# Patient Record
Sex: FEMALE | Race: BLACK OR AFRICAN AMERICAN | NOT HISPANIC OR LATINO | URBAN - METROPOLITAN AREA
[De-identification: names, ages, dates, MRNs, and addresses within clinical notes are randomized per-mention and may not be internally consistent; named-entity substitution may affect disease eponyms.]

---

## 2020-06-11 ENCOUNTER — OFFICE VISIT (OUTPATIENT)
Dept: URBAN - METROPOLITAN AREA CLINIC 91 | Facility: CLINIC | Age: 20
End: 2020-06-11
Payer: COMMERCIAL

## 2020-06-11 DIAGNOSIS — K51.80 CHRONIC PANCOLONIC ULCERATIVE COLITIS: ICD-10-CM

## 2020-06-11 PROCEDURE — 96413 CHEMO IV INFUSION 1 HR: CPT | Performed by: INTERNAL MEDICINE

## 2020-06-11 PROCEDURE — 96415 CHEMO IV INFUSION ADDL HR: CPT | Performed by: INTERNAL MEDICINE

## 2020-06-11 RX ORDER — MESALAMINE 1.2 G/1
TAKE 4 TABLETS (4.8 GRAM) BY ORAL ROUTE ONCE DAILY WITH A MEAL FOR 30 DAYS TABLET, DELAYED RELEASE ORAL 1
Qty: 120 | Refills: 5 | Status: ACTIVE | COMMUNITY
Start: 2017-10-05 | End: 1900-01-01

## 2020-06-11 RX ORDER — FLUCONAZOLE 150 MG/1
TAKE 1 TABLET (150 MG) BY ORAL ROUTE ONCE TABLET ORAL
Qty: 1 | Refills: 1 | Status: ACTIVE | COMMUNITY
Start: 2018-03-12 | End: 1900-01-01

## 2020-06-11 RX ORDER — DICYCLOMINE HYDROCHLORIDE 20 MG/1
TAKE 1 TABLET BY ORAL ROUTE 3 TIMES A DAY AS NEEDED FOR 30 DAYS TABLET ORAL
Qty: 90 | Refills: 5 | Status: ACTIVE | COMMUNITY
Start: 2020-02-03 | End: 2020-08-01

## 2020-06-11 RX ORDER — INFLIXIMAB 100 MG/10ML
INJECTION, POWDER, LYOPHILIZED, FOR SOLUTION INTRAVENOUS
Qty: 0 | Refills: 0 | Status: ACTIVE | COMMUNITY
Start: 1900-01-01 | End: 1900-01-01

## 2020-06-11 RX ORDER — MESALAMINE 375 MG/1
TAKE 4 CAPSULES (1,500 MG) BY ORAL ROUTE ONCE DAILY IN THE MORNING FOR 30 DAYS CAPSULE, EXTENDED RELEASE ORAL 1
Qty: 120 | Refills: 5 | Status: ACTIVE | COMMUNITY
Start: 2020-02-03 | End: 2020-08-01

## 2020-06-11 RX ORDER — BUDESONIDE 9 MG/1
1 TAB PO QD TABLET, EXTENDED RELEASE ORAL 1
Qty: 28 | Refills: 5 | Status: ACTIVE | COMMUNITY
Start: 2020-04-14 | End: 2020-09-29

## 2020-07-24 ENCOUNTER — OFFICE VISIT (OUTPATIENT)
Dept: URBAN - METROPOLITAN AREA CLINIC 91 | Facility: CLINIC | Age: 20
End: 2020-07-24

## 2020-07-31 ENCOUNTER — OFFICE VISIT (OUTPATIENT)
Dept: URBAN - METROPOLITAN AREA CLINIC 117 | Facility: CLINIC | Age: 20
End: 2020-07-31
Payer: COMMERCIAL

## 2020-07-31 DIAGNOSIS — K51.80 CHRONIC PANCOLONIC ULCERATIVE COLITIS: ICD-10-CM

## 2020-07-31 PROCEDURE — 96413 CHEMO IV INFUSION 1 HR: CPT | Performed by: INTERNAL MEDICINE

## 2020-07-31 PROCEDURE — 96375 TX/PRO/DX INJ NEW DRUG ADDON: CPT | Performed by: INTERNAL MEDICINE

## 2020-07-31 PROCEDURE — 96415 CHEMO IV INFUSION ADDL HR: CPT | Performed by: INTERNAL MEDICINE

## 2020-07-31 RX ORDER — BUDESONIDE 9 MG/1
1 TAB PO QD TABLET, EXTENDED RELEASE ORAL 1
Qty: 28 | Refills: 5 | Status: ACTIVE | COMMUNITY
Start: 2020-04-14 | End: 2020-09-29

## 2020-07-31 RX ORDER — MESALAMINE 1.2 G/1
TAKE 4 TABLETS (4.8 GRAM) BY ORAL ROUTE ONCE DAILY WITH A MEAL FOR 30 DAYS TABLET, DELAYED RELEASE ORAL 1
Qty: 120 | Refills: 5 | Status: ACTIVE | COMMUNITY
Start: 2017-10-05 | End: 1900-01-01

## 2020-07-31 RX ORDER — INFLIXIMAB 100 MG/10ML
INJECTION, POWDER, LYOPHILIZED, FOR SOLUTION INTRAVENOUS
Qty: 0 | Refills: 0 | Status: ACTIVE | COMMUNITY
Start: 1900-01-01 | End: 1900-01-01

## 2020-07-31 RX ORDER — MESALAMINE 375 MG/1
TAKE 4 CAPSULES (1,500 MG) BY ORAL ROUTE ONCE DAILY IN THE MORNING FOR 30 DAYS CAPSULE, EXTENDED RELEASE ORAL 1
Qty: 120 | Refills: 5 | Status: ACTIVE | COMMUNITY
Start: 2020-02-03 | End: 2020-08-01

## 2020-07-31 RX ORDER — FLUCONAZOLE 150 MG/1
TAKE 1 TABLET (150 MG) BY ORAL ROUTE ONCE TABLET ORAL
Qty: 1 | Refills: 1 | Status: ACTIVE | COMMUNITY
Start: 2018-03-12 | End: 1900-01-01

## 2020-07-31 RX ORDER — DICYCLOMINE HYDROCHLORIDE 20 MG/1
TAKE 1 TABLET BY ORAL ROUTE 3 TIMES A DAY AS NEEDED FOR 30 DAYS TABLET ORAL
Qty: 90 | Refills: 5 | Status: ACTIVE | COMMUNITY
Start: 2020-02-03 | End: 2020-08-01

## 2020-09-03 ENCOUNTER — OFFICE VISIT (OUTPATIENT)
Dept: URBAN - METROPOLITAN AREA CLINIC 117 | Facility: CLINIC | Age: 20
End: 2020-09-03
Payer: COMMERCIAL

## 2020-09-03 DIAGNOSIS — K51.80 CHRONIC PANCOLONIC ULCERATIVE COLITIS: ICD-10-CM

## 2020-09-03 PROCEDURE — 96375 TX/PRO/DX INJ NEW DRUG ADDON: CPT | Performed by: INTERNAL MEDICINE

## 2020-09-03 PROCEDURE — 96415 CHEMO IV INFUSION ADDL HR: CPT | Performed by: INTERNAL MEDICINE

## 2020-09-03 PROCEDURE — 96413 CHEMO IV INFUSION 1 HR: CPT | Performed by: INTERNAL MEDICINE

## 2020-09-03 RX ORDER — INFLIXIMAB 100 MG/10ML
INJECTION, POWDER, LYOPHILIZED, FOR SOLUTION INTRAVENOUS
Qty: 0 | Refills: 0 | Status: ACTIVE | COMMUNITY
Start: 1900-01-01 | End: 1900-01-01

## 2020-09-03 RX ORDER — BUDESONIDE 9 MG/1
1 TAB PO QD TABLET, EXTENDED RELEASE ORAL 1
Qty: 28 | Refills: 5 | Status: ACTIVE | COMMUNITY
Start: 2020-04-14 | End: 2020-09-29

## 2020-09-03 RX ORDER — MESALAMINE 1.2 G/1
TAKE 4 TABLETS (4.8 GRAM) BY ORAL ROUTE ONCE DAILY WITH A MEAL FOR 30 DAYS TABLET, DELAYED RELEASE ORAL 1
Qty: 120 | Refills: 5 | Status: ACTIVE | COMMUNITY
Start: 2017-10-05 | End: 1900-01-01

## 2020-09-03 RX ORDER — FLUCONAZOLE 150 MG/1
TAKE 1 TABLET (150 MG) BY ORAL ROUTE ONCE TABLET ORAL
Qty: 1 | Refills: 1 | Status: ACTIVE | COMMUNITY
Start: 2018-03-12 | End: 1900-01-01

## 2020-10-15 ENCOUNTER — OFFICE VISIT (OUTPATIENT)
Dept: URBAN - METROPOLITAN AREA CLINIC 117 | Facility: CLINIC | Age: 20
End: 2020-10-15
Payer: COMMERCIAL

## 2020-10-15 DIAGNOSIS — K51.80 CHRONIC PANCOLONIC ULCERATIVE COLITIS: ICD-10-CM

## 2020-10-15 PROCEDURE — 96375 TX/PRO/DX INJ NEW DRUG ADDON: CPT | Performed by: INTERNAL MEDICINE

## 2020-10-15 PROCEDURE — 96415 CHEMO IV INFUSION ADDL HR: CPT | Performed by: INTERNAL MEDICINE

## 2020-10-15 PROCEDURE — 96413 CHEMO IV INFUSION 1 HR: CPT | Performed by: INTERNAL MEDICINE

## 2020-10-15 RX ORDER — INFLIXIMAB 100 MG/10ML
INJECTION, POWDER, LYOPHILIZED, FOR SOLUTION INTRAVENOUS
Qty: 0 | Refills: 0 | Status: ACTIVE | COMMUNITY
Start: 1900-01-01 | End: 1900-01-01

## 2020-10-15 RX ORDER — FLUCONAZOLE 150 MG/1
TAKE 1 TABLET (150 MG) BY ORAL ROUTE ONCE TABLET ORAL
Qty: 1 | Refills: 1 | Status: ACTIVE | COMMUNITY
Start: 2018-03-12 | End: 1900-01-01

## 2020-10-15 RX ORDER — MESALAMINE 1.2 G/1
TAKE 4 TABLETS (4.8 GRAM) BY ORAL ROUTE ONCE DAILY WITH A MEAL FOR 30 DAYS TABLET, DELAYED RELEASE ORAL 1
Qty: 120 | Refills: 5 | Status: ACTIVE | COMMUNITY
Start: 2017-10-05 | End: 1900-01-01

## 2020-11-12 ENCOUNTER — OFFICE VISIT (OUTPATIENT)
Dept: URBAN - METROPOLITAN AREA CLINIC 118 | Facility: CLINIC | Age: 20
End: 2020-11-12
Payer: COMMERCIAL

## 2020-11-12 ENCOUNTER — LAB OUTSIDE AN ENCOUNTER (OUTPATIENT)
Dept: URBAN - METROPOLITAN AREA CLINIC 118 | Facility: CLINIC | Age: 20
End: 2020-11-12

## 2020-11-12 DIAGNOSIS — R19.4 CHANGE IN BOWEL HABITS: ICD-10-CM

## 2020-11-12 DIAGNOSIS — K52.9 IBD (INFLAMMATORY BOWEL DISEASE): ICD-10-CM

## 2020-11-12 PROCEDURE — 99214 OFFICE O/P EST MOD 30 MIN: CPT | Performed by: INTERNAL MEDICINE

## 2020-11-12 RX ORDER — FLUCONAZOLE 150 MG/1
TAKE 1 TABLET (150 MG) BY ORAL ROUTE ONCE TABLET ORAL
Qty: 1 | Refills: 1 | Status: ACTIVE | COMMUNITY
Start: 2018-03-12

## 2020-11-12 RX ORDER — INFLIXIMAB 100 MG/10ML
INJECTION, POWDER, LYOPHILIZED, FOR SOLUTION INTRAVENOUS
Qty: 0 | Refills: 0 | Status: ACTIVE | COMMUNITY
Start: 1900-01-01

## 2020-11-12 RX ORDER — MESALAMINE 1.2 G/1
TAKE 4 TABLETS (4.8 GRAM) BY ORAL ROUTE ONCE DAILY WITH A MEAL FOR 30 DAYS TABLET, DELAYED RELEASE ORAL 1
Qty: 120 | Refills: 5 | Status: ACTIVE | COMMUNITY
Start: 2017-10-05

## 2020-11-12 NOTE — HPI-TODAY'S VISIT:
pt h/o UC presents for follow up. Reports doing well, denies abdominal pain or diarrhea. Reports at baseline 2-3 bm per day. Denies weight loss or anemia. Denies UGI symptoms. Last colonoscopy 2019.

## 2020-11-13 LAB
A/G RATIO: 1.5
ALBUMIN: 4.4
ALKALINE PHOSPHATASE: 62
AST (SGOT): 18
BASO (ABSOLUTE): 0
BASOS: 1
BILIRUBIN, TOTAL: <0.2
BUN/CREATININE RATIO: 14
BUN: 8
C-REACTIVE PROTEIN, QUANT: <1
CALCIUM: 9.3
CARBON DIOXIDE, TOTAL: 21
CHLORIDE: 107
CREATININE: 0.58
EGFR IF AFRICN AM: 152
EGFR IF NONAFRICN AM: 132
EOS (ABSOLUTE): 0.1
EOS: 2
GLOBULIN, TOTAL: 2.9
GLUCOSE: 83
HEMATOCRIT: 37.9
HEMATOLOGY COMMENTS:: (no result)
HEMOGLOBIN: 11.3
IMMATURE CELLS: (no result)
IMMATURE GRANS (ABS): 0
IMMATURE GRANULOCYTES: 0
LYMPHS (ABSOLUTE): 2.1
LYMPHS: 42
MCH: 25.3
MCHC: 29.8
MCV: 85
MONOCYTES(ABSOLUTE): 0.5
MONOCYTES: 10
NEUTROPHILS (ABSOLUTE): 2.3
NEUTROPHILS: 45
NRBC: (no result)
PLATELETS: 326
POTASSIUM: 4.3
PROTEIN, TOTAL: 7.3
RBC: 4.46
RDW: 15.9
SODIUM: 138
WBC: 5

## 2020-11-19 ENCOUNTER — OFFICE VISIT (OUTPATIENT)
Dept: URBAN - METROPOLITAN AREA CLINIC 117 | Facility: CLINIC | Age: 20
End: 2020-11-19
Payer: COMMERCIAL

## 2020-11-19 DIAGNOSIS — K51.80 CHRONIC PANCOLONIC ULCERATIVE COLITIS: ICD-10-CM

## 2020-11-19 PROCEDURE — 96413 CHEMO IV INFUSION 1 HR: CPT | Performed by: INTERNAL MEDICINE

## 2020-11-19 PROCEDURE — 96375 TX/PRO/DX INJ NEW DRUG ADDON: CPT | Performed by: INTERNAL MEDICINE

## 2020-11-19 PROCEDURE — 96415 CHEMO IV INFUSION ADDL HR: CPT | Performed by: INTERNAL MEDICINE

## 2020-11-19 RX ORDER — MESALAMINE 1.2 G/1
TAKE 4 TABLETS (4.8 GRAM) BY ORAL ROUTE ONCE DAILY WITH A MEAL FOR 30 DAYS TABLET, DELAYED RELEASE ORAL 1
Qty: 120 | Refills: 5 | Status: ACTIVE | COMMUNITY
Start: 2017-10-05

## 2020-11-19 RX ORDER — INFLIXIMAB 100 MG/10ML
INJECTION, POWDER, LYOPHILIZED, FOR SOLUTION INTRAVENOUS
Qty: 0 | Refills: 0 | Status: ACTIVE | COMMUNITY
Start: 1900-01-01

## 2020-11-19 RX ORDER — FLUCONAZOLE 150 MG/1
TAKE 1 TABLET (150 MG) BY ORAL ROUTE ONCE TABLET ORAL
Qty: 1 | Refills: 1 | Status: ACTIVE | COMMUNITY
Start: 2018-03-12

## 2020-12-17 ENCOUNTER — OFFICE VISIT (OUTPATIENT)
Dept: URBAN - METROPOLITAN AREA CLINIC 118 | Facility: CLINIC | Age: 20
End: 2020-12-17

## 2020-12-22 ENCOUNTER — OFFICE VISIT (OUTPATIENT)
Dept: URBAN - METROPOLITAN AREA SURGERY CENTER 23 | Facility: SURGERY CENTER | Age: 20
End: 2020-12-22

## 2020-12-29 ENCOUNTER — OFFICE VISIT (OUTPATIENT)
Dept: URBAN - METROPOLITAN AREA SURGERY CENTER 23 | Facility: SURGERY CENTER | Age: 20
End: 2020-12-29
Payer: COMMERCIAL

## 2020-12-29 DIAGNOSIS — R19.4 ALTERED BOWEL HABITS: ICD-10-CM

## 2020-12-29 DIAGNOSIS — K52.9 INFLAMMATORY BOWEL DISEASE: ICD-10-CM

## 2020-12-29 DIAGNOSIS — K63.89 BACTERIAL OVERGROWTH SYNDROME: ICD-10-CM

## 2020-12-29 PROCEDURE — G8907 PT DOC NO EVENTS ON DISCHARG: HCPCS | Performed by: INTERNAL MEDICINE

## 2020-12-29 PROCEDURE — G9937 DIG OR SURV COLSCO: HCPCS | Performed by: INTERNAL MEDICINE

## 2020-12-29 PROCEDURE — 45380 COLONOSCOPY AND BIOPSY: CPT | Performed by: INTERNAL MEDICINE

## 2020-12-29 RX ORDER — INFLIXIMAB 100 MG/10ML
INJECTION, POWDER, LYOPHILIZED, FOR SOLUTION INTRAVENOUS
Qty: 0 | Refills: 0 | Status: ACTIVE | COMMUNITY
Start: 1900-01-01

## 2020-12-29 RX ORDER — MESALAMINE 1.2 G/1
TAKE 4 TABLETS (4.8 GRAM) BY ORAL ROUTE ONCE DAILY WITH A MEAL FOR 30 DAYS TABLET, DELAYED RELEASE ORAL 1
Qty: 120 | Refills: 5 | Status: ACTIVE | COMMUNITY
Start: 2017-10-05

## 2020-12-29 RX ORDER — FLUCONAZOLE 150 MG/1
TAKE 1 TABLET (150 MG) BY ORAL ROUTE ONCE TABLET ORAL
Qty: 1 | Refills: 1 | Status: ACTIVE | COMMUNITY
Start: 2018-03-12

## 2020-12-31 ENCOUNTER — OFFICE VISIT (OUTPATIENT)
Dept: URBAN - METROPOLITAN AREA CLINIC 117 | Facility: CLINIC | Age: 20
End: 2020-12-31

## 2021-01-07 ENCOUNTER — OFFICE VISIT (OUTPATIENT)
Dept: URBAN - METROPOLITAN AREA CLINIC 117 | Facility: CLINIC | Age: 21
End: 2021-01-07
Payer: COMMERCIAL

## 2021-01-07 VITALS
TEMPERATURE: 97.1 F | DIASTOLIC BLOOD PRESSURE: 84 MMHG | BODY MASS INDEX: 26.62 KG/M2 | HEIGHT: 61 IN | RESPIRATION RATE: 18 BRPM | SYSTOLIC BLOOD PRESSURE: 139 MMHG | WEIGHT: 141 LBS

## 2021-01-07 DIAGNOSIS — K51.80 CHRONIC PANCOLONIC ULCERATIVE COLITIS: ICD-10-CM

## 2021-01-07 PROCEDURE — 96375 TX/PRO/DX INJ NEW DRUG ADDON: CPT | Performed by: INTERNAL MEDICINE

## 2021-01-07 PROCEDURE — 96413 CHEMO IV INFUSION 1 HR: CPT | Performed by: INTERNAL MEDICINE

## 2021-01-07 PROCEDURE — 96415 CHEMO IV INFUSION ADDL HR: CPT | Performed by: INTERNAL MEDICINE

## 2021-01-07 RX ORDER — MESALAMINE 1.2 G/1
TAKE 4 TABLETS (4.8 GRAM) BY ORAL ROUTE ONCE DAILY WITH A MEAL FOR 30 DAYS TABLET, DELAYED RELEASE ORAL 1
Qty: 120 | Refills: 5 | Status: ACTIVE | COMMUNITY
Start: 2017-10-05

## 2021-01-07 RX ORDER — INFLIXIMAB 100 MG/10ML
INJECTION, POWDER, LYOPHILIZED, FOR SOLUTION INTRAVENOUS
Qty: 0 | Refills: 0 | Status: ACTIVE | COMMUNITY
Start: 1900-01-01

## 2021-01-07 RX ORDER — FLUCONAZOLE 150 MG/1
TAKE 1 TABLET (150 MG) BY ORAL ROUTE ONCE TABLET ORAL
Qty: 1 | Refills: 1 | Status: ACTIVE | COMMUNITY
Start: 2018-03-12

## 2021-02-18 ENCOUNTER — OFFICE VISIT (OUTPATIENT)
Dept: URBAN - METROPOLITAN AREA CLINIC 117 | Facility: CLINIC | Age: 21
End: 2021-02-18
Payer: COMMERCIAL

## 2021-02-18 DIAGNOSIS — K51.80 CHRONIC PANCOLONIC ULCERATIVE COLITIS: ICD-10-CM

## 2021-02-18 PROCEDURE — 96375 TX/PRO/DX INJ NEW DRUG ADDON: CPT | Performed by: INTERNAL MEDICINE

## 2021-02-18 PROCEDURE — 96413 CHEMO IV INFUSION 1 HR: CPT | Performed by: INTERNAL MEDICINE

## 2021-02-18 PROCEDURE — 96415 CHEMO IV INFUSION ADDL HR: CPT | Performed by: INTERNAL MEDICINE

## 2021-02-18 RX ORDER — INFLIXIMAB 100 MG/10ML
INJECTION, POWDER, LYOPHILIZED, FOR SOLUTION INTRAVENOUS
Qty: 0 | Refills: 0 | Status: ACTIVE | COMMUNITY
Start: 1900-01-01

## 2021-02-18 RX ORDER — MESALAMINE 1.2 G/1
TAKE 4 TABLETS (4.8 GRAM) BY ORAL ROUTE ONCE DAILY WITH A MEAL FOR 30 DAYS TABLET, DELAYED RELEASE ORAL 1
Qty: 120 | Refills: 5 | Status: ACTIVE | COMMUNITY
Start: 2017-10-05

## 2021-02-18 RX ORDER — FLUCONAZOLE 150 MG/1
TAKE 1 TABLET (150 MG) BY ORAL ROUTE ONCE TABLET ORAL
Qty: 1 | Refills: 1 | Status: ACTIVE | COMMUNITY
Start: 2018-03-12

## 2021-04-01 ENCOUNTER — OFFICE VISIT (OUTPATIENT)
Dept: URBAN - METROPOLITAN AREA CLINIC 117 | Facility: CLINIC | Age: 21
End: 2021-04-01
Payer: COMMERCIAL

## 2021-04-01 DIAGNOSIS — K51.80 CHRONIC PANCOLONIC ULCERATIVE COLITIS: ICD-10-CM

## 2021-04-01 PROCEDURE — 96375 TX/PRO/DX INJ NEW DRUG ADDON: CPT | Performed by: INTERNAL MEDICINE

## 2021-04-01 PROCEDURE — 96413 CHEMO IV INFUSION 1 HR: CPT | Performed by: INTERNAL MEDICINE

## 2021-04-01 PROCEDURE — 96415 CHEMO IV INFUSION ADDL HR: CPT | Performed by: INTERNAL MEDICINE

## 2021-04-01 RX ORDER — INFLIXIMAB 100 MG/10ML
INJECTION, POWDER, LYOPHILIZED, FOR SOLUTION INTRAVENOUS
Qty: 0 | Refills: 0 | Status: ACTIVE | COMMUNITY
Start: 1900-01-01

## 2021-04-01 RX ORDER — MESALAMINE 1.2 G/1
TAKE 4 TABLETS (4.8 GRAM) BY ORAL ROUTE ONCE DAILY WITH A MEAL FOR 30 DAYS TABLET, DELAYED RELEASE ORAL 1
Qty: 120 | Refills: 5 | Status: ACTIVE | COMMUNITY
Start: 2017-10-05

## 2021-04-01 RX ORDER — FLUCONAZOLE 150 MG/1
TAKE 1 TABLET (150 MG) BY ORAL ROUTE ONCE TABLET ORAL
Qty: 1 | Refills: 1 | Status: ACTIVE | COMMUNITY
Start: 2018-03-12

## 2021-05-13 ENCOUNTER — OFFICE VISIT (OUTPATIENT)
Dept: URBAN - METROPOLITAN AREA CLINIC 117 | Facility: CLINIC | Age: 21
End: 2021-05-13
Payer: COMMERCIAL

## 2021-05-13 DIAGNOSIS — K51.80 CHRONIC PANCOLONIC ULCERATIVE COLITIS: ICD-10-CM

## 2021-05-13 PROCEDURE — 96413 CHEMO IV INFUSION 1 HR: CPT | Performed by: INTERNAL MEDICINE

## 2021-05-13 PROCEDURE — 96415 CHEMO IV INFUSION ADDL HR: CPT | Performed by: INTERNAL MEDICINE

## 2021-05-13 PROCEDURE — 96375 TX/PRO/DX INJ NEW DRUG ADDON: CPT | Performed by: INTERNAL MEDICINE

## 2021-05-13 RX ORDER — INFLIXIMAB 100 MG/10ML
INJECTION, POWDER, LYOPHILIZED, FOR SOLUTION INTRAVENOUS
Qty: 0 | Refills: 0 | Status: ACTIVE | COMMUNITY
Start: 1900-01-01

## 2021-05-13 RX ORDER — MESALAMINE 1.2 G/1
TAKE 4 TABLETS (4.8 GRAM) BY ORAL ROUTE ONCE DAILY WITH A MEAL FOR 30 DAYS TABLET, DELAYED RELEASE ORAL 1
Qty: 120 | Refills: 5 | Status: ACTIVE | COMMUNITY
Start: 2017-10-05

## 2021-05-13 RX ORDER — FLUCONAZOLE 150 MG/1
TAKE 1 TABLET (150 MG) BY ORAL ROUTE ONCE TABLET ORAL
Qty: 1 | Refills: 1 | Status: ACTIVE | COMMUNITY
Start: 2018-03-12

## 2021-06-22 ENCOUNTER — TELEPHONE ENCOUNTER (OUTPATIENT)
Dept: URBAN - METROPOLITAN AREA CLINIC 23 | Facility: CLINIC | Age: 21
End: 2021-06-22

## 2021-06-22 RX ORDER — ONDANSETRON HYDROCHLORIDE 4 MG/1
1 TABLET, FILM COATED ORAL ONCE
Qty: 1 | Refills: 0 | OUTPATIENT
Start: 2021-08-18

## 2021-06-22 RX ORDER — IRON SUCROSE 20 MG/ML
AS DIRECTED INJECTION, SOLUTION INTRAVENOUS
Qty: 10 | Refills: 0 | OUTPATIENT
Start: 2021-08-18

## 2021-06-22 RX ORDER — FERRIC CARBOXYMALTOSE INJECTION 50 MG/ML
AS DIRECTED INJECTION, SOLUTION INTRAVENOUS
Qty: 1 | Refills: 0 | OUTPATIENT
Start: 2021-08-18

## 2021-06-22 RX ORDER — VEDOLIZUMAB 300 MG/5ML
AS DIRECTED INJECTION, POWDER, LYOPHILIZED, FOR SOLUTION INTRAVENOUS
Qty: 1 | Refills: 0 | OUTPATIENT
Start: 2021-08-18 | End: 2021-08-19

## 2021-06-22 RX ORDER — ONDANSETRON HYDROCHLORIDE 4 MG/1
1 TABLET TABLET, FILM COATED ORAL
Qty: 10 | Refills: 0 | OUTPATIENT
Start: 2021-08-18

## 2021-06-22 RX ORDER — USTEKINUMAB 130 MG/26ML
AS DIRECTED SOLUTION INTRAVENOUS
Qty: 3 | Refills: 0 | OUTPATIENT
Start: 2021-08-18 | End: 2021-08-19

## 2021-06-22 RX ORDER — CERTOLIZUMAB PEGOL 400 MG
AS DIRECTED KIT SUBCUTANEOUS
Qty: 1 | Refills: 0 | OUTPATIENT
Start: 2021-08-18 | End: 2021-08-19

## 2021-06-22 RX ORDER — INFLIXIMAB-DYYB 100 MG/10ML
AS DIRECTED INJECTION, POWDER, LYOPHILIZED, FOR SOLUTION INTRAVENOUS
Qty: 100 MILLIGRAM | Refills: 0 | OUTPATIENT
Start: 2021-08-18 | End: 2021-08-19

## 2021-06-22 RX ORDER — USTEKINUMAB 130 MG/26ML
AS DIRECTED SOLUTION INTRAVENOUS
Qty: 4 | Refills: 0 | OUTPATIENT
Start: 2021-08-18 | End: 2021-08-19

## 2021-06-22 RX ORDER — USTEKINUMAB 130 MG/26ML
AS DIRECTED SOLUTION INTRAVENOUS
Qty: 2 | Refills: 0 | OUTPATIENT
Start: 2021-08-18 | End: 2021-08-19

## 2021-06-22 RX ORDER — HYDROCORTISONE SODIUM SUCCINATE 100 MG/2ML
AS DIRECTED INJECTION, POWDER, FOR SOLUTION INTRAMUSCULAR; INTRAVENOUS
Qty: 1 | Refills: 0 | OUTPATIENT
Start: 2021-08-18 | End: 2021-08-19

## 2021-06-22 RX ORDER — ALBUMIN (HUMAN) 12.5 G/50ML
AS DIRECTED SOLUTION INTRAVENOUS ONCE
Qty: 1 BAG | Refills: 0 | OUTPATIENT
Start: 2021-08-18 | End: 2021-08-19

## 2021-06-22 RX ORDER — HYDROCORTISONE SODIUM SUCCINATE 250 MG/2ML
AS DIRECTED INJECTION, POWDER, FOR SOLUTION INTRAMUSCULAR; INTRAVENOUS
Qty: 1 | Refills: 0 | OUTPATIENT
Start: 2021-08-18 | End: 2021-08-19

## 2021-06-22 RX ORDER — DIPHENHYDRAMINE HCL 2 %
AS DIRECTED CREAM (GRAM) TOPICAL
Qty: 30 | Refills: 0 | OUTPATIENT
Start: 2021-08-18 | End: 2021-09-17

## 2021-06-22 RX ORDER — ACETAMINOPHEN 650 MG
2 TABLETS AS NEEDED TABLET, EXTENDED RELEASE ORAL
Qty: 10 | Refills: 0 | OUTPATIENT
Start: 2021-08-18 | End: 2021-08-19

## 2021-06-22 RX ORDER — ACETAMINOPHEN 650 MG
2 TABLETS AS NEEDED TABLET, EXTENDED RELEASE ORAL
Qty: 10 | Refills: 0 | OUTPATIENT
Start: 2021-07-29 | End: 2021-07-30

## 2021-06-22 RX ORDER — INFLIXIMAB 100 MG/10ML
AS DIRECTED INJECTION, POWDER, LYOPHILIZED, FOR SOLUTION INTRAVENOUS
Qty: 1 | Refills: 0 | OUTPATIENT
Start: 2021-08-18 | End: 2021-08-19

## 2021-06-22 RX ORDER — DIPHENHYDRAMINE HCL 2 %
AS DIRECTED CREAM (GRAM) TOPICAL
Qty: 30 | Refills: 0 | OUTPATIENT
Start: 2021-07-29 | End: 2021-08-28

## 2021-06-22 RX ORDER — INFLIXIMAB-DYYB 100 MG/10ML
AS DIRECTED INJECTION, POWDER, LYOPHILIZED, FOR SOLUTION INTRAVENOUS
Qty: 100 MILLIGRAM | Refills: 0 | OUTPATIENT
Start: 2021-07-29 | End: 2021-07-30

## 2021-06-22 RX ORDER — INFLIXIMAB 100 MG/1
AS DIRECTED INJECTION, POWDER, LYOPHILIZED, FOR SOLUTION INTRAVENOUS
OUTPATIENT
Start: 2021-08-18

## 2021-06-24 ENCOUNTER — OFFICE VISIT (OUTPATIENT)
Dept: URBAN - METROPOLITAN AREA CLINIC 117 | Facility: CLINIC | Age: 21
End: 2021-06-24
Payer: COMMERCIAL

## 2021-06-24 DIAGNOSIS — K51.80 CHRONIC PANCOLONIC ULCERATIVE COLITIS: ICD-10-CM

## 2021-06-24 PROCEDURE — 96415 CHEMO IV INFUSION ADDL HR: CPT | Performed by: INTERNAL MEDICINE

## 2021-06-24 PROCEDURE — 96375 TX/PRO/DX INJ NEW DRUG ADDON: CPT | Performed by: INTERNAL MEDICINE

## 2021-06-24 PROCEDURE — 96413 CHEMO IV INFUSION 1 HR: CPT | Performed by: INTERNAL MEDICINE

## 2021-06-24 RX ORDER — FLUCONAZOLE 150 MG/1
TAKE 1 TABLET (150 MG) BY ORAL ROUTE ONCE TABLET ORAL
Qty: 1 | Refills: 1 | Status: ACTIVE | COMMUNITY
Start: 2018-03-12

## 2021-06-24 RX ORDER — MESALAMINE 1.2 G/1
TAKE 4 TABLETS (4.8 GRAM) BY ORAL ROUTE ONCE DAILY WITH A MEAL FOR 30 DAYS TABLET, DELAYED RELEASE ORAL 1
Qty: 120 | Refills: 5 | Status: ACTIVE | COMMUNITY
Start: 2017-10-05

## 2021-06-24 RX ORDER — INFLIXIMAB 100 MG/10ML
INJECTION, POWDER, LYOPHILIZED, FOR SOLUTION INTRAVENOUS
Qty: 0 | Refills: 0 | Status: ACTIVE | COMMUNITY
Start: 1900-01-01

## 2021-06-29 ENCOUNTER — LAB OUTSIDE AN ENCOUNTER (OUTPATIENT)
Dept: URBAN - METROPOLITAN AREA CLINIC 23 | Facility: CLINIC | Age: 21
End: 2021-06-29

## 2021-07-14 ENCOUNTER — OFFICE VISIT (OUTPATIENT)
Dept: URBAN - METROPOLITAN AREA CLINIC 118 | Facility: CLINIC | Age: 21
End: 2021-07-14

## 2021-07-23 ENCOUNTER — TELEPHONE ENCOUNTER (OUTPATIENT)
Dept: URBAN - METROPOLITAN AREA CLINIC 92 | Facility: CLINIC | Age: 21
End: 2021-07-23

## 2021-07-23 RX ORDER — ACETAMINOPHEN 650 MG
2 TABLETS AS NEEDED TABLET, EXTENDED RELEASE ORAL
Qty: 10 | Refills: 0 | OUTPATIENT
Start: 2021-07-23 | End: 2021-07-24

## 2021-07-23 RX ORDER — DIPHENHYDRAMINE HCL 2 %
AS DIRECTED CREAM (GRAM) TOPICAL
Qty: 30 | Refills: 0 | OUTPATIENT
Start: 2021-07-23 | End: 2021-08-22

## 2021-07-23 RX ORDER — INFLIXIMAB-DYYB 100 MG/10ML
AS DIRECTED INJECTION, POWDER, LYOPHILIZED, FOR SOLUTION INTRAVENOUS
Qty: 100 MILLIGRAM | Refills: 0 | OUTPATIENT
Start: 2021-07-23 | End: 2021-07-24

## 2021-07-30 ENCOUNTER — LAB OUTSIDE AN ENCOUNTER (OUTPATIENT)
Dept: URBAN - METROPOLITAN AREA CLINIC 92 | Facility: CLINIC | Age: 21
End: 2021-07-30

## 2021-08-05 ENCOUNTER — OFFICE VISIT (OUTPATIENT)
Dept: URBAN - METROPOLITAN AREA CLINIC 117 | Facility: CLINIC | Age: 21
End: 2021-08-05

## 2021-08-10 ENCOUNTER — OFFICE VISIT (OUTPATIENT)
Dept: URBAN - METROPOLITAN AREA CLINIC 97 | Facility: CLINIC | Age: 21
End: 2021-08-10
Payer: COMMERCIAL

## 2021-08-10 ENCOUNTER — TELEPHONE ENCOUNTER (OUTPATIENT)
Dept: URBAN - METROPOLITAN AREA CLINIC 18 | Facility: CLINIC | Age: 21
End: 2021-08-10

## 2021-08-10 DIAGNOSIS — K51.80 CHRONIC PANCOLONIC ULCERATIVE COLITIS: ICD-10-CM

## 2021-08-10 PROCEDURE — 96415 CHEMO IV INFUSION ADDL HR: CPT | Performed by: INTERNAL MEDICINE

## 2021-08-10 PROCEDURE — 96375 TX/PRO/DX INJ NEW DRUG ADDON: CPT | Performed by: INTERNAL MEDICINE

## 2021-08-10 PROCEDURE — 96413 CHEMO IV INFUSION 1 HR: CPT | Performed by: INTERNAL MEDICINE

## 2021-08-10 RX ORDER — FLUCONAZOLE 150 MG/1
TAKE 1 TABLET (150 MG) BY ORAL ROUTE ONCE TABLET ORAL
Qty: 1 | Refills: 1 | Status: ACTIVE | COMMUNITY
Start: 2018-03-12

## 2021-08-10 RX ORDER — MESALAMINE 1.2 G/1
TAKE 4 TABLETS (4.8 GRAM) BY ORAL ROUTE ONCE DAILY WITH A MEAL FOR 30 DAYS TABLET, DELAYED RELEASE ORAL 1
Qty: 120 | Refills: 5 | Status: ACTIVE | COMMUNITY
Start: 2017-10-05

## 2021-08-10 RX ORDER — DIPHENHYDRAMINE HCL 2 %
AS DIRECTED CREAM (GRAM) TOPICAL
Qty: 30 | Refills: 0 | Status: ACTIVE | COMMUNITY
Start: 2021-07-29 | End: 2021-08-28

## 2021-08-10 RX ORDER — INFLIXIMAB 100 MG/10ML
INJECTION, POWDER, LYOPHILIZED, FOR SOLUTION INTRAVENOUS
Qty: 0 | Refills: 0 | Status: ACTIVE | COMMUNITY
Start: 1900-01-01

## 2021-08-10 RX ORDER — DIPHENHYDRAMINE HCL 2 %
AS DIRECTED CREAM (GRAM) TOPICAL
Qty: 30 | Refills: 0 | Status: ACTIVE | COMMUNITY
Start: 2021-07-23 | End: 2021-08-22

## 2021-09-20 ENCOUNTER — TELEPHONE ENCOUNTER (OUTPATIENT)
Dept: URBAN - METROPOLITAN AREA CLINIC 118 | Facility: CLINIC | Age: 21
End: 2021-09-20

## 2021-09-23 ENCOUNTER — OFFICE VISIT (OUTPATIENT)
Dept: URBAN - METROPOLITAN AREA CLINIC 117 | Facility: CLINIC | Age: 21
End: 2021-09-23

## 2021-09-23 ENCOUNTER — OFFICE VISIT (OUTPATIENT)
Dept: URBAN - METROPOLITAN AREA CLINIC 91 | Facility: CLINIC | Age: 21
End: 2021-09-23
Payer: COMMERCIAL

## 2021-09-23 VITALS
DIASTOLIC BLOOD PRESSURE: 95 MMHG | HEART RATE: 93 BPM | SYSTOLIC BLOOD PRESSURE: 123 MMHG | WEIGHT: 139 LBS | BODY MASS INDEX: 26.24 KG/M2 | RESPIRATION RATE: 17 BRPM | HEIGHT: 61 IN | TEMPERATURE: 98.3 F

## 2021-09-23 DIAGNOSIS — K51.80 CHRONIC PANCOLONIC ULCERATIVE COLITIS: ICD-10-CM

## 2021-09-23 PROCEDURE — 96415 CHEMO IV INFUSION ADDL HR: CPT | Performed by: INTERNAL MEDICINE

## 2021-09-23 PROCEDURE — 96375 TX/PRO/DX INJ NEW DRUG ADDON: CPT | Performed by: INTERNAL MEDICINE

## 2021-09-23 PROCEDURE — 96413 CHEMO IV INFUSION 1 HR: CPT | Performed by: INTERNAL MEDICINE

## 2021-09-23 RX ORDER — ONDANSETRON HYDROCHLORIDE 4 MG/1
1 TABLET TABLET, FILM COATED ORAL
Qty: 10 | Refills: 0 | Status: ACTIVE | COMMUNITY
Start: 2021-08-18

## 2021-09-23 RX ORDER — IRON SUCROSE 20 MG/ML
AS DIRECTED INJECTION, SOLUTION INTRAVENOUS
Qty: 10 | Refills: 0 | Status: ACTIVE | COMMUNITY
Start: 2021-08-18

## 2021-09-23 RX ORDER — ONDANSETRON HYDROCHLORIDE 4 MG/1
1 TABLET, FILM COATED ORAL ONCE
Qty: 1 | Refills: 0 | Status: ACTIVE | COMMUNITY
Start: 2021-08-18

## 2021-09-23 RX ORDER — INFLIXIMAB 100 MG/1
AS DIRECTED INJECTION, POWDER, LYOPHILIZED, FOR SOLUTION INTRAVENOUS
Status: ACTIVE | COMMUNITY
Start: 2021-08-18

## 2021-09-23 RX ORDER — INFLIXIMAB 100 MG/10ML
INJECTION, POWDER, LYOPHILIZED, FOR SOLUTION INTRAVENOUS
Qty: 0 | Refills: 0 | Status: ACTIVE | COMMUNITY
Start: 1900-01-01

## 2021-09-23 RX ORDER — MESALAMINE 1.2 G/1
TAKE 4 TABLETS (4.8 GRAM) BY ORAL ROUTE ONCE DAILY WITH A MEAL FOR 30 DAYS TABLET, DELAYED RELEASE ORAL 1
Qty: 120 | Refills: 5 | Status: ACTIVE | COMMUNITY
Start: 2017-10-05

## 2021-09-23 RX ORDER — FLUCONAZOLE 150 MG/1
TAKE 1 TABLET (150 MG) BY ORAL ROUTE ONCE TABLET ORAL
Qty: 1 | Refills: 1 | Status: ACTIVE | COMMUNITY
Start: 2018-03-12

## 2021-09-23 RX ORDER — FERRIC CARBOXYMALTOSE INJECTION 50 MG/ML
AS DIRECTED INJECTION, SOLUTION INTRAVENOUS
Qty: 1 | Refills: 0 | Status: ACTIVE | COMMUNITY
Start: 2021-08-18

## 2021-10-28 ENCOUNTER — OFFICE VISIT (OUTPATIENT)
Dept: URBAN - METROPOLITAN AREA CLINIC 91 | Facility: CLINIC | Age: 21
End: 2021-10-28
Payer: COMMERCIAL

## 2021-10-28 VITALS
HEIGHT: 61 IN | HEART RATE: 106 BPM | WEIGHT: 141 LBS | TEMPERATURE: 98.2 F | SYSTOLIC BLOOD PRESSURE: 138 MMHG | DIASTOLIC BLOOD PRESSURE: 87 MMHG | BODY MASS INDEX: 26.62 KG/M2 | RESPIRATION RATE: 16 BRPM

## 2021-10-28 DIAGNOSIS — K51.80 CHRONIC PANCOLONIC ULCERATIVE COLITIS: ICD-10-CM

## 2021-10-28 PROCEDURE — 96375 TX/PRO/DX INJ NEW DRUG ADDON: CPT | Performed by: INTERNAL MEDICINE

## 2021-10-28 PROCEDURE — 96413 CHEMO IV INFUSION 1 HR: CPT | Performed by: INTERNAL MEDICINE

## 2021-10-28 PROCEDURE — 96415 CHEMO IV INFUSION ADDL HR: CPT | Performed by: INTERNAL MEDICINE

## 2021-10-28 RX ORDER — FERRIC CARBOXYMALTOSE INJECTION 50 MG/ML
AS DIRECTED INJECTION, SOLUTION INTRAVENOUS
Qty: 1 | Refills: 0 | Status: ACTIVE | COMMUNITY
Start: 2021-08-18

## 2021-10-28 RX ORDER — INFLIXIMAB 100 MG/10ML
INJECTION, POWDER, LYOPHILIZED, FOR SOLUTION INTRAVENOUS
Qty: 0 | Refills: 0 | Status: ACTIVE | COMMUNITY
Start: 1900-01-01

## 2021-10-28 RX ORDER — ONDANSETRON HYDROCHLORIDE 4 MG/1
1 TABLET TABLET, FILM COATED ORAL
Qty: 10 | Refills: 0 | Status: ACTIVE | COMMUNITY
Start: 2021-08-18

## 2021-10-28 RX ORDER — IRON SUCROSE 20 MG/ML
AS DIRECTED INJECTION, SOLUTION INTRAVENOUS
Qty: 10 | Refills: 0 | Status: ACTIVE | COMMUNITY
Start: 2021-08-18

## 2021-10-28 RX ORDER — ONDANSETRON HYDROCHLORIDE 4 MG/1
1 TABLET, FILM COATED ORAL ONCE
Qty: 1 | Refills: 0 | Status: ACTIVE | COMMUNITY
Start: 2021-08-18

## 2021-10-28 RX ORDER — MESALAMINE 1.2 G/1
TAKE 4 TABLETS (4.8 GRAM) BY ORAL ROUTE ONCE DAILY WITH A MEAL FOR 30 DAYS TABLET, DELAYED RELEASE ORAL 1
Qty: 120 | Refills: 5 | Status: ACTIVE | COMMUNITY
Start: 2017-10-05

## 2021-10-28 RX ORDER — INFLIXIMAB 100 MG/1
AS DIRECTED INJECTION, POWDER, LYOPHILIZED, FOR SOLUTION INTRAVENOUS
Status: ACTIVE | COMMUNITY
Start: 2021-08-18

## 2021-10-28 RX ORDER — FLUCONAZOLE 150 MG/1
TAKE 1 TABLET (150 MG) BY ORAL ROUTE ONCE TABLET ORAL
Qty: 1 | Refills: 1 | Status: ACTIVE | COMMUNITY
Start: 2018-03-12

## 2021-11-04 ENCOUNTER — OFFICE VISIT (OUTPATIENT)
Dept: URBAN - METROPOLITAN AREA CLINIC 117 | Facility: CLINIC | Age: 21
End: 2021-11-04

## 2021-12-17 ENCOUNTER — OFFICE VISIT (OUTPATIENT)
Dept: URBAN - METROPOLITAN AREA CLINIC 91 | Facility: CLINIC | Age: 21
End: 2021-12-17
Payer: COMMERCIAL

## 2021-12-17 VITALS
DIASTOLIC BLOOD PRESSURE: 93 MMHG | TEMPERATURE: 97.7 F | BODY MASS INDEX: 26.43 KG/M2 | HEIGHT: 61 IN | SYSTOLIC BLOOD PRESSURE: 121 MMHG | RESPIRATION RATE: 18 BRPM | HEART RATE: 111 BPM | WEIGHT: 140 LBS

## 2021-12-17 DIAGNOSIS — K51.80 CHRONIC PANCOLONIC ULCERATIVE COLITIS: ICD-10-CM

## 2021-12-17 PROCEDURE — 96375 TX/PRO/DX INJ NEW DRUG ADDON: CPT | Performed by: INTERNAL MEDICINE

## 2021-12-17 PROCEDURE — 96413 CHEMO IV INFUSION 1 HR: CPT | Performed by: INTERNAL MEDICINE

## 2021-12-17 PROCEDURE — 96415 CHEMO IV INFUSION ADDL HR: CPT | Performed by: INTERNAL MEDICINE

## 2021-12-17 RX ORDER — INFLIXIMAB 100 MG/10ML
INJECTION, POWDER, LYOPHILIZED, FOR SOLUTION INTRAVENOUS
Qty: 0 | Refills: 0 | Status: ACTIVE | COMMUNITY
Start: 1900-01-01

## 2021-12-17 RX ORDER — INFLIXIMAB 100 MG/1
AS DIRECTED INJECTION, POWDER, LYOPHILIZED, FOR SOLUTION INTRAVENOUS
Status: ACTIVE | COMMUNITY
Start: 2021-08-18

## 2021-12-17 RX ORDER — ONDANSETRON HYDROCHLORIDE 4 MG/1
1 TABLET TABLET, FILM COATED ORAL
Qty: 10 | Refills: 0 | Status: ACTIVE | COMMUNITY
Start: 2021-08-18

## 2021-12-17 RX ORDER — MESALAMINE 1.2 G/1
TAKE 4 TABLETS (4.8 GRAM) BY ORAL ROUTE ONCE DAILY WITH A MEAL FOR 30 DAYS TABLET, DELAYED RELEASE ORAL 1
Qty: 120 | Refills: 5 | Status: ACTIVE | COMMUNITY
Start: 2017-10-05

## 2021-12-17 RX ORDER — ONDANSETRON HYDROCHLORIDE 4 MG/1
1 TABLET, FILM COATED ORAL ONCE
Qty: 1 | Refills: 0 | Status: ACTIVE | COMMUNITY
Start: 2021-08-18

## 2021-12-17 RX ORDER — FLUCONAZOLE 150 MG/1
TAKE 1 TABLET (150 MG) BY ORAL ROUTE ONCE TABLET ORAL
Qty: 1 | Refills: 1 | Status: ACTIVE | COMMUNITY
Start: 2018-03-12

## 2021-12-17 RX ORDER — IRON SUCROSE 20 MG/ML
AS DIRECTED INJECTION, SOLUTION INTRAVENOUS
Qty: 10 | Refills: 0 | Status: ACTIVE | COMMUNITY
Start: 2021-08-18

## 2021-12-17 RX ORDER — FERRIC CARBOXYMALTOSE INJECTION 50 MG/ML
AS DIRECTED INJECTION, SOLUTION INTRAVENOUS
Qty: 1 | Refills: 0 | Status: ACTIVE | COMMUNITY
Start: 2021-08-18

## 2022-01-26 ENCOUNTER — OFFICE VISIT (OUTPATIENT)
Dept: URBAN - METROPOLITAN AREA CLINIC 118 | Facility: CLINIC | Age: 22
End: 2022-01-26
Payer: COMMERCIAL

## 2022-01-26 DIAGNOSIS — K62.89 RECTAL PAIN: ICD-10-CM

## 2022-01-26 DIAGNOSIS — K62.5 RECTAL BLEEDING: ICD-10-CM

## 2022-01-26 PROCEDURE — 99214 OFFICE O/P EST MOD 30 MIN: CPT | Performed by: INTERNAL MEDICINE

## 2022-01-26 RX ORDER — INFLIXIMAB 100 MG/10ML
INJECTION, POWDER, LYOPHILIZED, FOR SOLUTION INTRAVENOUS
Qty: 0 | Refills: 0 | Status: ACTIVE | COMMUNITY
Start: 1900-01-01

## 2022-01-26 RX ORDER — INFLIXIMAB 100 MG/1
AS DIRECTED INJECTION, POWDER, LYOPHILIZED, FOR SOLUTION INTRAVENOUS
Status: DISCONTINUED | COMMUNITY
Start: 2021-08-18

## 2022-01-26 RX ORDER — FERRIC CARBOXYMALTOSE INJECTION 50 MG/ML
AS DIRECTED INJECTION, SOLUTION INTRAVENOUS
Qty: 1 | Refills: 0 | Status: DISCONTINUED | COMMUNITY
Start: 2021-08-18

## 2022-01-26 RX ORDER — LIDOCAINE 30 MG/G
1 APPLICATION AS NEEDED CREAM TOPICAL THREE TIMES A DAY
Qty: 30 | Refills: 3 | OUTPATIENT
Start: 2022-01-26

## 2022-01-26 RX ORDER — ONDANSETRON HYDROCHLORIDE 4 MG/1
1 TABLET TABLET, FILM COATED ORAL
Qty: 10 | Refills: 0 | Status: DISCONTINUED | COMMUNITY
Start: 2021-08-18

## 2022-01-26 RX ORDER — IRON SUCROSE 20 MG/ML
AS DIRECTED INJECTION, SOLUTION INTRAVENOUS
Qty: 10 | Refills: 0 | Status: DISCONTINUED | COMMUNITY
Start: 2021-08-18

## 2022-01-26 RX ORDER — FLUCONAZOLE 150 MG/1
TAKE 1 TABLET (150 MG) BY ORAL ROUTE ONCE TABLET ORAL
Qty: 1 | Refills: 1 | Status: DISCONTINUED | COMMUNITY
Start: 2018-03-12

## 2022-01-26 RX ORDER — HYDROCORTISONE ACETATE 25 MG/1
1 SUPPOSITORY SUPPOSITORY RECTAL
Qty: 60 | Refills: 3 | OUTPATIENT
Start: 2022-01-26 | End: 2022-05-26

## 2022-01-26 RX ORDER — MESALAMINE 1.2 G/1
TAKE 4 TABLETS (4.8 GRAM) BY ORAL ROUTE ONCE DAILY WITH A MEAL FOR 30 DAYS TABLET, DELAYED RELEASE ORAL 1
Qty: 120 | Refills: 5 | Status: DISCONTINUED | COMMUNITY
Start: 2017-10-05

## 2022-01-26 RX ORDER — ONDANSETRON HYDROCHLORIDE 4 MG/1
1 TABLET, FILM COATED ORAL ONCE
Qty: 1 | Refills: 0 | Status: DISCONTINUED | COMMUNITY
Start: 2021-08-18

## 2022-01-26 NOTE — HPI-TODAY'S VISIT:
pt h/o UC stable on Remicade presents for rectal symptoms. pt notes 1 month of intermittent rectal bleeding and pain. pt notes bm's stable though does admit occasional straining with bm's. pt feels she has internal hemorrhoids and has tried otc meds with mild relief. No abdominal pain, diarrhea or other GI complaints. No UGI symptoms.

## 2022-02-01 ENCOUNTER — TELEPHONE ENCOUNTER (OUTPATIENT)
Dept: URBAN - METROPOLITAN AREA CLINIC 118 | Facility: CLINIC | Age: 22
End: 2022-02-01

## 2022-02-01 RX ORDER — HYDROCORTISONE 2.5% 25 MG/G
1 APPLICATION CREAM TOPICAL TWICE A DAY
Qty: 30 | Refills: 3 | OUTPATIENT
Start: 2022-02-01 | End: 2022-06-01

## 2022-02-09 ENCOUNTER — OFFICE VISIT (OUTPATIENT)
Dept: URBAN - METROPOLITAN AREA CLINIC 117 | Facility: CLINIC | Age: 22
End: 2022-02-09
Payer: COMMERCIAL

## 2022-02-09 VITALS
BODY MASS INDEX: 27.19 KG/M2 | TEMPERATURE: 97.9 F | SYSTOLIC BLOOD PRESSURE: 140 MMHG | HEART RATE: 89 BPM | DIASTOLIC BLOOD PRESSURE: 88 MMHG | WEIGHT: 144 LBS | HEIGHT: 61 IN | RESPIRATION RATE: 20 BRPM

## 2022-02-09 DIAGNOSIS — K51.80 CHRONIC PANCOLONIC ULCERATIVE COLITIS: ICD-10-CM

## 2022-02-09 PROCEDURE — 96413 CHEMO IV INFUSION 1 HR: CPT | Performed by: INTERNAL MEDICINE

## 2022-02-09 PROCEDURE — 96415 CHEMO IV INFUSION ADDL HR: CPT | Performed by: INTERNAL MEDICINE

## 2022-02-09 PROCEDURE — 96375 TX/PRO/DX INJ NEW DRUG ADDON: CPT | Performed by: INTERNAL MEDICINE

## 2022-02-09 RX ORDER — HYDROCORTISONE ACETATE 25 MG/1
1 SUPPOSITORY SUPPOSITORY RECTAL
Qty: 60 | Refills: 3 | Status: ACTIVE | COMMUNITY
Start: 2022-01-26 | End: 2022-05-26

## 2022-02-09 RX ORDER — HYDROCORTISONE 2.5% 25 MG/G
1 APPLICATION CREAM TOPICAL TWICE A DAY
Qty: 30 | Refills: 3 | Status: ACTIVE | COMMUNITY
Start: 2022-02-01 | End: 2022-06-01

## 2022-02-09 RX ORDER — INFLIXIMAB 100 MG/10ML
INJECTION, POWDER, LYOPHILIZED, FOR SOLUTION INTRAVENOUS
Qty: 0 | Refills: 0 | Status: ACTIVE | COMMUNITY
Start: 1900-01-01

## 2022-02-09 RX ORDER — LIDOCAINE 30 MG/G
1 APPLICATION AS NEEDED CREAM TOPICAL THREE TIMES A DAY
Qty: 30 | Refills: 3 | Status: ACTIVE | COMMUNITY
Start: 2022-01-26

## 2022-02-16 ENCOUNTER — TELEPHONE ENCOUNTER (OUTPATIENT)
Dept: URBAN - METROPOLITAN AREA CLINIC 118 | Facility: CLINIC | Age: 22
End: 2022-02-16

## 2022-03-14 ENCOUNTER — TELEPHONE ENCOUNTER (OUTPATIENT)
Dept: URBAN - METROPOLITAN AREA CLINIC 92 | Facility: CLINIC | Age: 22
End: 2022-03-14

## 2022-03-14 RX ORDER — DIPHENHYDRAMINE HCL 2 %
1 CAPSULE AT BEDTIME AS NEEDED CREAM (GRAM) TOPICAL ONCE A DAY
Qty: 30 | Refills: 0 | OUTPATIENT
Start: 2022-03-14 | End: 2022-04-13

## 2022-03-14 RX ORDER — INFLIXIMAB 100 MG/10ML
AS DIRECTED INJECTION, POWDER, LYOPHILIZED, FOR SOLUTION INTRAVENOUS
Qty: 100 MILLIGRAMS | Refills: 0 | OUTPATIENT
Start: 2022-03-14 | End: 2022-04-13

## 2022-03-14 RX ORDER — ACETAMINOPHEN 650 MG
2 TABLETS AS NEEDED TABLET, EXTENDED RELEASE ORAL
Qty: 6 TABLET | Refills: 0 | OUTPATIENT
Start: 2022-03-14 | End: 2022-03-15

## 2022-03-18 ENCOUNTER — OFFICE VISIT (OUTPATIENT)
Dept: URBAN - METROPOLITAN AREA CLINIC 117 | Facility: CLINIC | Age: 22
End: 2022-03-18
Payer: COMMERCIAL

## 2022-03-18 DIAGNOSIS — K51.80 CHRONIC PANCOLONIC ULCERATIVE COLITIS: ICD-10-CM

## 2022-03-18 PROCEDURE — 96413 CHEMO IV INFUSION 1 HR: CPT | Performed by: INTERNAL MEDICINE

## 2022-03-18 PROCEDURE — 96415 CHEMO IV INFUSION ADDL HR: CPT | Performed by: INTERNAL MEDICINE

## 2022-03-18 PROCEDURE — 96375 TX/PRO/DX INJ NEW DRUG ADDON: CPT | Performed by: INTERNAL MEDICINE

## 2022-03-18 RX ORDER — LIDOCAINE 30 MG/G
1 APPLICATION AS NEEDED CREAM TOPICAL THREE TIMES A DAY
Qty: 30 | Refills: 3 | Status: ACTIVE | COMMUNITY
Start: 2022-01-26

## 2022-03-18 RX ORDER — INFLIXIMAB 100 MG/10ML
INJECTION, POWDER, LYOPHILIZED, FOR SOLUTION INTRAVENOUS
Qty: 0 | Refills: 0 | Status: ACTIVE | COMMUNITY
Start: 1900-01-01

## 2022-03-18 RX ORDER — DIPHENHYDRAMINE HCL 2 %
1 CAPSULE AT BEDTIME AS NEEDED CREAM (GRAM) TOPICAL ONCE A DAY
Qty: 30 | Refills: 0 | Status: ACTIVE | COMMUNITY
Start: 2022-03-14 | End: 2022-04-13

## 2022-03-18 RX ORDER — HYDROCORTISONE 2.5% 25 MG/G
1 APPLICATION CREAM TOPICAL TWICE A DAY
Qty: 30 | Refills: 3 | Status: ACTIVE | COMMUNITY
Start: 2022-02-01 | End: 2022-06-01

## 2022-03-18 RX ORDER — INFLIXIMAB 100 MG/10ML
AS DIRECTED INJECTION, POWDER, LYOPHILIZED, FOR SOLUTION INTRAVENOUS
Qty: 100 MILLIGRAMS | Refills: 0 | Status: ACTIVE | COMMUNITY
Start: 2022-03-14 | End: 2022-04-13

## 2022-03-18 RX ORDER — HYDROCORTISONE ACETATE 25 MG/1
1 SUPPOSITORY SUPPOSITORY RECTAL
Qty: 60 | Refills: 3 | Status: ACTIVE | COMMUNITY
Start: 2022-01-26 | End: 2022-05-26

## 2022-03-28 PROBLEM — 64766004: Status: ACTIVE | Noted: 2021-07-23

## 2022-03-28 PROBLEM — 64766004 ULCERATIVE COLITIS: Status: ACTIVE | Noted: 2021-07-14

## 2022-04-29 ENCOUNTER — CLAIMS CREATED FROM THE CLAIM WINDOW (OUTPATIENT)
Dept: URBAN - METROPOLITAN AREA CLINIC 117 | Facility: CLINIC | Age: 22
End: 2022-04-29

## 2022-04-29 ENCOUNTER — OFFICE VISIT (OUTPATIENT)
Dept: URBAN - METROPOLITAN AREA CLINIC 117 | Facility: CLINIC | Age: 22
End: 2022-04-29

## 2022-04-29 ENCOUNTER — CLAIMS CREATED FROM THE CLAIM WINDOW (OUTPATIENT)
Dept: URBAN - METROPOLITAN AREA CLINIC 117 | Facility: CLINIC | Age: 22
End: 2022-04-29
Payer: COMMERCIAL

## 2022-04-29 DIAGNOSIS — K51.80 CHRONIC PANCOLONIC ULCERATIVE COLITIS: ICD-10-CM

## 2022-04-29 PROCEDURE — 96413 CHEMO IV INFUSION 1 HR: CPT | Performed by: INTERNAL MEDICINE

## 2022-04-29 PROCEDURE — 96375 TX/PRO/DX INJ NEW DRUG ADDON: CPT | Performed by: INTERNAL MEDICINE

## 2022-04-29 PROCEDURE — 96415 CHEMO IV INFUSION ADDL HR: CPT | Performed by: INTERNAL MEDICINE

## 2022-04-29 RX ORDER — LIDOCAINE 30 MG/G
1 APPLICATION AS NEEDED CREAM TOPICAL THREE TIMES A DAY
Qty: 30 | Refills: 3 | Status: ACTIVE | COMMUNITY
Start: 2022-01-26

## 2022-04-29 RX ORDER — INFLIXIMAB 100 MG/10ML
INJECTION, POWDER, LYOPHILIZED, FOR SOLUTION INTRAVENOUS
Qty: 0 | Refills: 0 | Status: ACTIVE | COMMUNITY
Start: 1900-01-01

## 2022-04-29 RX ORDER — HYDROCORTISONE 2.5% 25 MG/G
1 APPLICATION CREAM TOPICAL TWICE A DAY
Qty: 30 | Refills: 3 | Status: ACTIVE | COMMUNITY
Start: 2022-02-01 | End: 2022-06-01

## 2022-04-29 RX ORDER — HYDROCORTISONE ACETATE 25 MG/1
1 SUPPOSITORY SUPPOSITORY RECTAL
Qty: 60 | Refills: 3 | Status: ACTIVE | COMMUNITY
Start: 2022-01-26 | End: 2022-05-26

## 2022-06-10 ENCOUNTER — OFFICE VISIT (OUTPATIENT)
Dept: URBAN - METROPOLITAN AREA CLINIC 117 | Facility: CLINIC | Age: 22
End: 2022-06-10

## 2022-06-16 ENCOUNTER — OFFICE VISIT (OUTPATIENT)
Dept: URBAN - METROPOLITAN AREA CLINIC 117 | Facility: CLINIC | Age: 22
End: 2022-06-16
Payer: COMMERCIAL

## 2022-06-16 VITALS
DIASTOLIC BLOOD PRESSURE: 76 MMHG | TEMPERATURE: 98.1 F | HEART RATE: 101 BPM | HEIGHT: 61 IN | WEIGHT: 147 LBS | RESPIRATION RATE: 20 BRPM | BODY MASS INDEX: 27.75 KG/M2 | SYSTOLIC BLOOD PRESSURE: 127 MMHG

## 2022-06-16 DIAGNOSIS — K51.80 CHRONIC PANCOLONIC ULCERATIVE COLITIS: ICD-10-CM

## 2022-06-16 PROCEDURE — 96375 TX/PRO/DX INJ NEW DRUG ADDON: CPT | Performed by: INTERNAL MEDICINE

## 2022-06-16 PROCEDURE — 96413 CHEMO IV INFUSION 1 HR: CPT | Performed by: INTERNAL MEDICINE

## 2022-06-16 PROCEDURE — 96415 CHEMO IV INFUSION ADDL HR: CPT | Performed by: INTERNAL MEDICINE

## 2022-06-16 RX ORDER — LIDOCAINE 30 MG/G
1 APPLICATION AS NEEDED CREAM TOPICAL THREE TIMES A DAY
Qty: 30 | Refills: 3 | Status: ACTIVE | COMMUNITY
Start: 2022-01-26

## 2022-06-16 RX ORDER — INFLIXIMAB 100 MG/10ML
INJECTION, POWDER, LYOPHILIZED, FOR SOLUTION INTRAVENOUS
Qty: 0 | Refills: 0 | Status: ACTIVE | COMMUNITY
Start: 1900-01-01

## 2022-07-22 ENCOUNTER — OFFICE VISIT (OUTPATIENT)
Dept: URBAN - METROPOLITAN AREA CLINIC 91 | Facility: CLINIC | Age: 22
End: 2022-07-22
Payer: COMMERCIAL

## 2022-07-22 VITALS
SYSTOLIC BLOOD PRESSURE: 140 MMHG | DIASTOLIC BLOOD PRESSURE: 69 MMHG | WEIGHT: 148.2 LBS | TEMPERATURE: 97.9 F | HEART RATE: 104 BPM | BODY MASS INDEX: 27.98 KG/M2 | RESPIRATION RATE: 20 BRPM | HEIGHT: 61 IN

## 2022-07-22 DIAGNOSIS — K51.90 ACUTE ULCERATIVE COLITIS: ICD-10-CM

## 2022-07-22 PROCEDURE — 96375 TX/PRO/DX INJ NEW DRUG ADDON: CPT | Performed by: INTERNAL MEDICINE

## 2022-07-22 PROCEDURE — 96413 CHEMO IV INFUSION 1 HR: CPT | Performed by: INTERNAL MEDICINE

## 2022-07-22 PROCEDURE — 96415 CHEMO IV INFUSION ADDL HR: CPT | Performed by: INTERNAL MEDICINE

## 2022-07-22 RX ORDER — INFLIXIMAB 100 MG/10ML
INJECTION, POWDER, LYOPHILIZED, FOR SOLUTION INTRAVENOUS
Qty: 0 | Refills: 0 | Status: ACTIVE | COMMUNITY
Start: 1900-01-01

## 2022-07-22 RX ORDER — LIDOCAINE 30 MG/G
1 APPLICATION AS NEEDED CREAM TOPICAL THREE TIMES A DAY
Qty: 30 | Refills: 3 | Status: ACTIVE | COMMUNITY
Start: 2022-01-26

## 2022-09-02 ENCOUNTER — OFFICE VISIT (OUTPATIENT)
Dept: URBAN - METROPOLITAN AREA CLINIC 117 | Facility: CLINIC | Age: 22
End: 2022-09-02
Payer: COMMERCIAL

## 2022-09-02 ENCOUNTER — WEB ENCOUNTER (OUTPATIENT)
Dept: URBAN - METROPOLITAN AREA CLINIC 117 | Facility: CLINIC | Age: 22
End: 2022-09-02

## 2022-09-02 VITALS
RESPIRATION RATE: 20 BRPM | SYSTOLIC BLOOD PRESSURE: 122 MMHG | HEIGHT: 61 IN | DIASTOLIC BLOOD PRESSURE: 91 MMHG | HEART RATE: 97 BPM | TEMPERATURE: 98.1 F | BODY MASS INDEX: 27.38 KG/M2 | WEIGHT: 145 LBS

## 2022-09-02 DIAGNOSIS — K51.90 ACUTE ULCERATIVE COLITIS: ICD-10-CM

## 2022-09-02 PROCEDURE — 96413 CHEMO IV INFUSION 1 HR: CPT | Performed by: INTERNAL MEDICINE

## 2022-09-02 PROCEDURE — 96375 TX/PRO/DX INJ NEW DRUG ADDON: CPT | Performed by: INTERNAL MEDICINE

## 2022-09-02 PROCEDURE — 96415 CHEMO IV INFUSION ADDL HR: CPT | Performed by: INTERNAL MEDICINE

## 2022-09-02 RX ORDER — INFLIXIMAB 100 MG/10ML
INJECTION, POWDER, LYOPHILIZED, FOR SOLUTION INTRAVENOUS
Qty: 0 | Refills: 0 | Status: ACTIVE | COMMUNITY
Start: 1900-01-01

## 2022-09-02 RX ORDER — LIDOCAINE 30 MG/G
1 APPLICATION AS NEEDED CREAM TOPICAL THREE TIMES A DAY
Qty: 30 | Refills: 3 | Status: ACTIVE | COMMUNITY
Start: 2022-01-26

## 2022-10-12 ENCOUNTER — OFFICE VISIT (OUTPATIENT)
Dept: URBAN - METROPOLITAN AREA CLINIC 117 | Facility: CLINIC | Age: 22
End: 2022-10-12
Payer: COMMERCIAL

## 2022-10-12 VITALS
SYSTOLIC BLOOD PRESSURE: 138 MMHG | TEMPERATURE: 98.1 F | WEIGHT: 147 LBS | DIASTOLIC BLOOD PRESSURE: 88 MMHG | BODY MASS INDEX: 27.75 KG/M2 | HEIGHT: 61 IN | RESPIRATION RATE: 18 BRPM | HEART RATE: 99 BPM

## 2022-10-12 DIAGNOSIS — K51.90 ACUTE ULCERATIVE COLITIS: ICD-10-CM

## 2022-10-12 PROCEDURE — 96415 CHEMO IV INFUSION ADDL HR: CPT | Performed by: INTERNAL MEDICINE

## 2022-10-12 PROCEDURE — 96413 CHEMO IV INFUSION 1 HR: CPT | Performed by: INTERNAL MEDICINE

## 2022-10-12 PROCEDURE — 96375 TX/PRO/DX INJ NEW DRUG ADDON: CPT | Performed by: INTERNAL MEDICINE

## 2022-10-12 RX ORDER — INFLIXIMAB 100 MG/10ML
INJECTION, POWDER, LYOPHILIZED, FOR SOLUTION INTRAVENOUS
Qty: 0 | Refills: 0 | Status: ACTIVE | COMMUNITY
Start: 1900-01-01

## 2022-10-12 RX ORDER — LIDOCAINE 30 MG/G
1 APPLICATION AS NEEDED CREAM TOPICAL THREE TIMES A DAY
Qty: 30 | Refills: 3 | Status: ACTIVE | COMMUNITY
Start: 2022-01-26

## 2022-11-01 ENCOUNTER — OFFICE VISIT (OUTPATIENT)
Dept: URBAN - METROPOLITAN AREA CLINIC 118 | Facility: CLINIC | Age: 22
End: 2022-11-01
Payer: COMMERCIAL

## 2022-11-01 ENCOUNTER — LAB OUTSIDE AN ENCOUNTER (OUTPATIENT)
Dept: URBAN - METROPOLITAN AREA CLINIC 118 | Facility: CLINIC | Age: 22
End: 2022-11-01

## 2022-11-01 VITALS
HEIGHT: 61 IN | BODY MASS INDEX: 27.79 KG/M2 | TEMPERATURE: 98.6 F | HEART RATE: 100 BPM | DIASTOLIC BLOOD PRESSURE: 103 MMHG | SYSTOLIC BLOOD PRESSURE: 142 MMHG | WEIGHT: 147.2 LBS

## 2022-11-01 DIAGNOSIS — K52.9 IBD (INFLAMMATORY BOWEL DISEASE): ICD-10-CM

## 2022-11-01 DIAGNOSIS — K64.9 HEMORRHOIDS, UNSPECIFIED HEMORRHOID TYPE: ICD-10-CM

## 2022-11-01 DIAGNOSIS — K51.90 ULCERATIVE COLITIS WITHOUT COMPLICATIONS, UNSPECIFIED LOCATION: ICD-10-CM

## 2022-11-01 DIAGNOSIS — R19.4 CHANGE IN BOWEL HABITS: ICD-10-CM

## 2022-11-01 PROBLEM — 64766004: Status: ACTIVE | Noted: 2021-07-29

## 2022-11-01 PROCEDURE — 99214 OFFICE O/P EST MOD 30 MIN: CPT | Performed by: INTERNAL MEDICINE

## 2022-11-01 RX ORDER — LIDOCAINE 30 MG/G
1 APPLICATION AS NEEDED CREAM TOPICAL THREE TIMES A DAY
Qty: 30 | Refills: 3 | Status: ACTIVE | COMMUNITY
Start: 2022-01-26

## 2022-11-01 RX ORDER — HYDROCORTISONE 2.5% 25 MG/G
1 APPLICATION CREAM TOPICAL TWICE A DAY
Qty: 30 | Refills: 3 | OUTPATIENT
Start: 2022-11-01 | End: 2023-02-28

## 2022-11-01 RX ORDER — INFLIXIMAB 100 MG/10ML
INJECTION, POWDER, LYOPHILIZED, FOR SOLUTION INTRAVENOUS
Qty: 0 | Refills: 0 | Status: ACTIVE | COMMUNITY
Start: 1900-01-01

## 2022-11-01 NOTE — HPI-TODAY'S VISIT:
pt presents for GI evaluation. pt h/o IBD now stable. Pt reports no diarrhea, constipation. Pt noted mild left sided abdominal pain occasionally with eating. pt notes occasional rectal bleeding with brb which she feels is due to hemorrhoids. Does note prior straining. No weight loss. No UGI symptoms at this time. pt taking otc meds for hemorrhoids at this time and on Remicade per protocol for IBD.

## 2022-11-02 LAB
ABSOLUTE BASOPHILS: 28
ABSOLUTE EOSINOPHILS: 212
ABSOLUTE LYMPHOCYTES: 2045
ABSOLUTE MONOCYTES: 470
ABSOLUTE NEUTROPHILS: 1946
ALBUMIN/GLOBULIN RATIO: 1.6
ALBUMIN: 4.2
ALKALINE PHOSPHATASE: 59
ALT: 13
AST: 17
BASOPHILS: 0.6
BILIRUBIN, TOTAL: 0.3
BUN/CREATININE RATIO: (no result)
C-REACTIVE PROTEIN, QUANT: <0.2
CALCIUM: 9.5
CARBON DIOXIDE: 25
CHLORIDE: 107
CREATININE: 0.57
EGFR: 131
EOSINOPHILS: 4.5
FIB 4 INDEX: 0.32
FIB 4 INTERPRETATION: (no result)
GLOBULIN: 2.7
GLUCOSE: 74
HEMATOCRIT: 36.5
HEMOGLOBIN: 12.2
LYMPHOCYTES: 43.5
MCH: 28.7
MCHC: 33.4
MCV: 85.9
MONOCYTES: 10
MPV: 10.7
NEUTROPHILS: 41.4
PLATELET COUNT: 344
PLATELET COUNT: 344
POTASSIUM: 4.2
PROTEIN, TOTAL: 6.9
RDW: 14
RED BLOOD CELL COUNT: 4.25
SODIUM: 140
UREA NITROGEN (BUN): 10
WHITE BLOOD CELL COUNT: 4.7

## 2022-11-18 ENCOUNTER — OFFICE VISIT (OUTPATIENT)
Dept: URBAN - METROPOLITAN AREA SURGERY CENTER 23 | Facility: SURGERY CENTER | Age: 22
End: 2022-11-18

## 2022-11-18 ENCOUNTER — CLAIMS CREATED FROM THE CLAIM WINDOW (OUTPATIENT)
Dept: URBAN - METROPOLITAN AREA SURGERY CENTER 23 | Facility: SURGERY CENTER | Age: 22
End: 2022-11-18
Payer: COMMERCIAL

## 2022-11-18 ENCOUNTER — CLAIMS CREATED FROM THE CLAIM WINDOW (OUTPATIENT)
Dept: URBAN - METROPOLITAN AREA CLINIC 4 | Facility: CLINIC | Age: 22
End: 2022-11-18
Payer: COMMERCIAL

## 2022-11-18 DIAGNOSIS — K51.00 ACUTE ULCERATIVE PANCOLITIS: ICD-10-CM

## 2022-11-18 DIAGNOSIS — K31.89 OTHER DISEASES OF STOMACH AND DUODENUM: ICD-10-CM

## 2022-11-18 PROCEDURE — G8907 PT DOC NO EVENTS ON DISCHARG: HCPCS | Performed by: INTERNAL MEDICINE

## 2022-11-18 PROCEDURE — 88305 TISSUE EXAM BY PATHOLOGIST: CPT | Performed by: PATHOLOGY

## 2022-11-18 PROCEDURE — 45380 COLONOSCOPY AND BIOPSY: CPT | Performed by: INTERNAL MEDICINE

## 2022-11-30 ENCOUNTER — OFFICE VISIT (OUTPATIENT)
Dept: URBAN - METROPOLITAN AREA CLINIC 117 | Facility: CLINIC | Age: 22
End: 2022-11-30
Payer: COMMERCIAL

## 2022-11-30 VITALS
SYSTOLIC BLOOD PRESSURE: 143 MMHG | DIASTOLIC BLOOD PRESSURE: 85 MMHG | HEART RATE: 101 BPM | BODY MASS INDEX: 27.56 KG/M2 | TEMPERATURE: 97.9 F | WEIGHT: 146 LBS | HEIGHT: 61 IN | RESPIRATION RATE: 20 BRPM

## 2022-11-30 DIAGNOSIS — K51.80 CHRONIC PANCOLONIC ULCERATIVE COLITIS: ICD-10-CM

## 2022-11-30 PROCEDURE — 96413 CHEMO IV INFUSION 1 HR: CPT | Performed by: INTERNAL MEDICINE

## 2022-11-30 PROCEDURE — 96375 TX/PRO/DX INJ NEW DRUG ADDON: CPT | Performed by: INTERNAL MEDICINE

## 2022-11-30 PROCEDURE — 96415 CHEMO IV INFUSION ADDL HR: CPT | Performed by: INTERNAL MEDICINE

## 2022-11-30 RX ORDER — LIDOCAINE 30 MG/G
1 APPLICATION AS NEEDED CREAM TOPICAL THREE TIMES A DAY
Qty: 30 | Refills: 3 | Status: ACTIVE | COMMUNITY
Start: 2022-01-26

## 2022-11-30 RX ORDER — INFLIXIMAB 100 MG/10ML
INJECTION, POWDER, LYOPHILIZED, FOR SOLUTION INTRAVENOUS
Qty: 0 | Refills: 0 | Status: ACTIVE | COMMUNITY
Start: 1900-01-01

## 2022-11-30 RX ORDER — HYDROCORTISONE 2.5% 25 MG/G
1 APPLICATION CREAM TOPICAL TWICE A DAY
Qty: 30 | Refills: 3 | Status: ACTIVE | COMMUNITY
Start: 2022-11-01 | End: 2023-02-28

## 2022-12-16 ENCOUNTER — TELEPHONE ENCOUNTER (OUTPATIENT)
Dept: URBAN - METROPOLITAN AREA CLINIC 97 | Facility: CLINIC | Age: 22
End: 2022-12-16

## 2023-01-11 ENCOUNTER — OFFICE VISIT (OUTPATIENT)
Dept: URBAN - METROPOLITAN AREA CLINIC 117 | Facility: CLINIC | Age: 23
End: 2023-01-11

## 2023-01-25 ENCOUNTER — TELEPHONE ENCOUNTER (OUTPATIENT)
Dept: URBAN - METROPOLITAN AREA CLINIC 92 | Facility: CLINIC | Age: 23
End: 2023-01-25

## 2023-01-31 LAB
A/G RATIO: 1.6
ALBUMIN: 4.2
ALKALINE PHOSPHATASE: 61
ALT (SGPT): 11
AST (SGOT): 15
BILIRUBIN, TOTAL: 0.3
BUN/CREATININE RATIO: (no result)
BUN: 8
CALCIUM: 9.3
CARBON DIOXIDE, TOTAL: 24
CHLORIDE: 106
CREATININE: 0.58
EGFR: 130
GLOBULIN, TOTAL: 2.6
GLUCOSE: 81
HEMATOCRIT: 38.2
HEMOGLOBIN: 12.2
HEPATITIS B SURFACE ANTIGEN: (no result)
MCH: 28.4
MCHC: 31.9
MCV: 89
MITOGEN-NIL: >10
MPV: 11.1
PLATELET COUNT: 300
POTASSIUM: 4.3
PROTEIN, TOTAL: 6.8
QUANTIFERON NIL VALUE: 0.05
QUANTIFERON TB1 AG VALUE: <0
QUANTIFERON TB2 AG VALUE: <0
QUANTIFERON-TB GOLD PLUS: NEGATIVE
RDW: 13
RED BLOOD CELL COUNT: 4.29
SODIUM: 138
WHITE BLOOD CELL COUNT: 4.6

## 2023-10-06 ENCOUNTER — LAB OUTSIDE AN ENCOUNTER (OUTPATIENT)
Dept: URBAN - METROPOLITAN AREA CLINIC 118 | Facility: CLINIC | Age: 23
End: 2023-10-06

## 2023-10-06 ENCOUNTER — OFFICE VISIT (OUTPATIENT)
Dept: URBAN - METROPOLITAN AREA CLINIC 118 | Facility: CLINIC | Age: 23
End: 2023-10-06
Payer: COMMERCIAL

## 2023-10-06 VITALS
BODY MASS INDEX: 26.61 KG/M2 | SYSTOLIC BLOOD PRESSURE: 145 MMHG | HEART RATE: 106 BPM | WEIGHT: 150.2 LBS | TEMPERATURE: 98.8 F | HEIGHT: 63 IN | DIASTOLIC BLOOD PRESSURE: 100 MMHG

## 2023-10-06 DIAGNOSIS — R19.7 ACUTE DIARRHEA: ICD-10-CM

## 2023-10-06 DIAGNOSIS — K62.5 RECTAL BLEEDING: ICD-10-CM

## 2023-10-06 DIAGNOSIS — K64.4 RESIDUAL HEMORRHOIDAL SKIN TAGS: ICD-10-CM

## 2023-10-06 PROCEDURE — 99214 OFFICE O/P EST MOD 30 MIN: CPT | Performed by: INTERNAL MEDICINE

## 2023-10-06 RX ORDER — INFLIXIMAB 100 MG/10ML
INJECTION, POWDER, LYOPHILIZED, FOR SOLUTION INTRAVENOUS
Qty: 0 | Refills: 0 | Status: ACTIVE | COMMUNITY
Start: 1900-01-01

## 2023-10-06 RX ORDER — LIDOCAINE 30 MG/G
1 APPLICATION AS NEEDED CREAM TOPICAL THREE TIMES A DAY
Qty: 30 | Refills: 3 | Status: ON HOLD | COMMUNITY
Start: 2022-01-26

## 2023-10-06 RX ORDER — HYDROCORTISONE ACETATE 25 MG/1
1 SUPPOSITORY SUPPOSITORY RECTAL TWICE A DAY
Qty: 60 | Refills: 5 | OUTPATIENT
Start: 2023-10-06 | End: 2024-04-03

## 2023-10-06 RX ORDER — LIDOCAINE 40 MG/G
1 APPLICATION AS NEEDED CREAM TOPICAL THREE TIMES A DAY
Qty: 30 | Refills: 3 | OUTPATIENT
Start: 2023-10-06

## 2023-10-06 NOTE — HPI-TODAY'S VISIT:
pt h/o UC presents for follow up. pt on Inflectra per protocol. pt reports rectal bleeding and pain felt due to hemorrhoids. pt notes previous meds not helping. Bm overall stable. pt also reports recent psoriasis and concerned whether Inflectra related. Denies UGI symptoms. Last colonocopy 11/2022 stable.

## 2023-12-05 ENCOUNTER — OFFICE VISIT (OUTPATIENT)
Dept: URBAN - METROPOLITAN AREA CLINIC 118 | Facility: CLINIC | Age: 23
End: 2023-12-05

## 2023-12-05 ENCOUNTER — CLAIMS CREATED FROM THE CLAIM WINDOW (OUTPATIENT)
Dept: URBAN - METROPOLITAN AREA CLINIC 118 | Facility: CLINIC | Age: 23
End: 2023-12-05

## 2023-12-05 ENCOUNTER — CLAIMS CREATED FROM THE CLAIM WINDOW (OUTPATIENT)
Dept: URBAN - METROPOLITAN AREA CLINIC 118 | Facility: CLINIC | Age: 23
End: 2023-12-05
Payer: COMMERCIAL

## 2023-12-05 VITALS
DIASTOLIC BLOOD PRESSURE: 102 MMHG | WEIGHT: 154.8 LBS | BODY MASS INDEX: 27.43 KG/M2 | HEIGHT: 63 IN | SYSTOLIC BLOOD PRESSURE: 142 MMHG | TEMPERATURE: 97.2 F | HEART RATE: 111 BPM

## 2023-12-05 DIAGNOSIS — K51.919 ULCERATIVE COLITIS WITH COMPLICATION: ICD-10-CM

## 2023-12-05 DIAGNOSIS — K52.9 INFLAMMATORY BOWEL DISEASE: ICD-10-CM

## 2023-12-05 PROCEDURE — 99215 OFFICE O/P EST HI 40 MIN: CPT | Performed by: INTERNAL MEDICINE

## 2023-12-05 RX ORDER — VEDOLIZUMAB 300 MG/5ML
AS DIRECTED INJECTION, POWDER, LYOPHILIZED, FOR SOLUTION INTRAVENOUS
OUTPATIENT
Start: 2023-12-07

## 2023-12-05 RX ORDER — BUDESONIDE 3 MG/1
3 CAPSULES CAPSULE, DELAYED RELEASE PELLETS ORAL ONCE A DAY
Qty: 90 CAPSULE | Refills: 5 | OUTPATIENT
Start: 2023-12-05

## 2023-12-05 RX ORDER — LIDOCAINE 40 MG/G
1 APPLICATION AS NEEDED CREAM TOPICAL THREE TIMES A DAY
Qty: 30 | Refills: 3 | Status: DISCONTINUED | COMMUNITY
Start: 2023-10-06

## 2023-12-05 RX ORDER — HYDROCORTISONE ACETATE 25 MG/1
1 SUPPOSITORY SUPPOSITORY RECTAL TWICE A DAY
Qty: 60 | Refills: 5 | Status: DISCONTINUED | COMMUNITY
Start: 2023-10-06 | End: 2024-04-03

## 2023-12-05 RX ORDER — DIPHENHYDRAMINE HCL 2 %
1 CAPSULE AT BEDTIME AS NEEDED CREAM (GRAM) TOPICAL ONCE A DAY
Qty: 30 | OUTPATIENT
Start: 2023-12-07 | End: 2024-01-06

## 2023-12-05 RX ORDER — LIDOCAINE 30 MG/G
1 APPLICATION AS NEEDED CREAM TOPICAL THREE TIMES A DAY
Qty: 30 | Refills: 3 | Status: ON HOLD | COMMUNITY
Start: 2022-01-26

## 2023-12-05 RX ORDER — INFLIXIMAB 100 MG/10ML
INJECTION, POWDER, LYOPHILIZED, FOR SOLUTION INTRAVENOUS
Qty: 0 | Refills: 0 | Status: DISCONTINUED | COMMUNITY
Start: 1900-01-01

## 2023-12-05 RX ORDER — ACETAMINOPHEN 650 MG
2 TABLETS AS NEEDED TABLET, EXTENDED RELEASE ORAL
OUTPATIENT
Start: 2023-12-07

## 2023-12-05 NOTE — HPI-TODAY'S VISIT:
pt h/o UC and has been stable on Inflectra presents for follow up. pt with noted breakout along extremities and had, seen by Derm, bx raised Inflectra related, told to stop med. pt notes GI symptoms overall stable at this time, denies diarrhea, constipation or rectal bleeding. No recent weight loss or anemia. No UGI symptoms.

## 2023-12-06 ENCOUNTER — TELEPHONE ENCOUNTER (OUTPATIENT)
Dept: URBAN - METROPOLITAN AREA CLINIC 118 | Facility: CLINIC | Age: 23
End: 2023-12-06

## 2023-12-07 ENCOUNTER — TELEPHONE ENCOUNTER (OUTPATIENT)
Dept: URBAN - METROPOLITAN AREA CLINIC 118 | Facility: CLINIC | Age: 23
End: 2023-12-07

## 2023-12-07 ENCOUNTER — DASHBOARD ENCOUNTERS (OUTPATIENT)
Age: 23
End: 2023-12-07

## 2024-01-11 ENCOUNTER — OFFICE VISIT (OUTPATIENT)
Dept: URBAN - METROPOLITAN AREA CLINIC 117 | Facility: CLINIC | Age: 24
End: 2024-01-11
Payer: COMMERCIAL

## 2024-01-11 VITALS
TEMPERATURE: 98.1 F | HEIGHT: 63 IN | SYSTOLIC BLOOD PRESSURE: 141 MMHG | BODY MASS INDEX: 27.29 KG/M2 | RESPIRATION RATE: 18 BRPM | WEIGHT: 154 LBS | DIASTOLIC BLOOD PRESSURE: 82 MMHG | HEART RATE: 99 BPM

## 2024-01-11 DIAGNOSIS — K51.80 CHRONIC PANCOLONIC ULCERATIVE COLITIS: ICD-10-CM

## 2024-01-11 PROCEDURE — 96413 CHEMO IV INFUSION 1 HR: CPT | Performed by: INTERNAL MEDICINE

## 2024-01-11 RX ORDER — LIDOCAINE 30 MG/G
1 APPLICATION AS NEEDED CREAM TOPICAL THREE TIMES A DAY
Qty: 30 | Refills: 3 | Status: ON HOLD | COMMUNITY
Start: 2022-01-26

## 2024-01-11 RX ORDER — VEDOLIZUMAB 300 MG/5ML
AS DIRECTED INJECTION, POWDER, LYOPHILIZED, FOR SOLUTION INTRAVENOUS
Status: ACTIVE | COMMUNITY
Start: 2023-12-07

## 2024-01-11 RX ORDER — ACETAMINOPHEN 650 MG
2 TABLETS AS NEEDED TABLET, EXTENDED RELEASE ORAL
Status: ACTIVE | COMMUNITY
Start: 2023-12-07

## 2024-01-11 RX ORDER — BUDESONIDE 3 MG/1
3 CAPSULES CAPSULE, DELAYED RELEASE PELLETS ORAL ONCE A DAY
Qty: 90 CAPSULE | Refills: 5 | Status: ACTIVE | COMMUNITY
Start: 2023-12-05

## 2024-01-12 ENCOUNTER — P2P PATIENT RECORD (OUTPATIENT)
Age: 24
End: 2024-01-12

## 2024-01-25 ENCOUNTER — OFFICE VISIT (OUTPATIENT)
Dept: URBAN - METROPOLITAN AREA CLINIC 117 | Facility: CLINIC | Age: 24
End: 2024-01-25
Payer: COMMERCIAL

## 2024-01-25 VITALS
DIASTOLIC BLOOD PRESSURE: 93 MMHG | HEIGHT: 63 IN | SYSTOLIC BLOOD PRESSURE: 122 MMHG | RESPIRATION RATE: 20 BRPM | TEMPERATURE: 98 F | WEIGHT: 154 LBS | BODY MASS INDEX: 27.29 KG/M2 | HEART RATE: 105 BPM

## 2024-01-25 DIAGNOSIS — K51.80 CHRONIC PANCOLONIC ULCERATIVE COLITIS: ICD-10-CM

## 2024-01-25 PROCEDURE — 96413 CHEMO IV INFUSION 1 HR: CPT | Performed by: INTERNAL MEDICINE

## 2024-01-25 RX ORDER — LIDOCAINE 30 MG/G
1 APPLICATION AS NEEDED CREAM TOPICAL THREE TIMES A DAY
Qty: 30 | Refills: 3 | Status: ON HOLD | COMMUNITY
Start: 2022-01-26

## 2024-01-25 RX ORDER — VEDOLIZUMAB 300 MG/5ML
AS DIRECTED INJECTION, POWDER, LYOPHILIZED, FOR SOLUTION INTRAVENOUS
Status: ACTIVE | COMMUNITY
Start: 2023-12-07

## 2024-01-25 RX ORDER — BUDESONIDE 3 MG/1
3 CAPSULES CAPSULE, DELAYED RELEASE PELLETS ORAL ONCE A DAY
Qty: 90 CAPSULE | Refills: 5 | Status: ACTIVE | COMMUNITY
Start: 2023-12-05

## 2024-01-25 RX ORDER — ACETAMINOPHEN 650 MG
2 TABLETS AS NEEDED TABLET, EXTENDED RELEASE ORAL
Status: ACTIVE | COMMUNITY
Start: 2023-12-07

## 2024-02-22 ENCOUNTER — ENT (OUTPATIENT)
Dept: URBAN - METROPOLITAN AREA CLINIC 117 | Facility: CLINIC | Age: 24
End: 2024-02-22
Payer: COMMERCIAL

## 2024-02-22 VITALS
BODY MASS INDEX: 28.56 KG/M2 | TEMPERATURE: 98.1 F | WEIGHT: 161.2 LBS | SYSTOLIC BLOOD PRESSURE: 138 MMHG | RESPIRATION RATE: 20 BRPM | HEART RATE: 84 BPM | DIASTOLIC BLOOD PRESSURE: 88 MMHG | HEIGHT: 63 IN

## 2024-02-22 DIAGNOSIS — K51.80 OTHER ULCERATIVE COLITIS: ICD-10-CM

## 2024-02-22 PROCEDURE — 96413 CHEMO IV INFUSION 1 HR: CPT | Performed by: INTERNAL MEDICINE

## 2024-02-22 RX ORDER — VEDOLIZUMAB 300 MG/5ML
AS DIRECTED INJECTION, POWDER, LYOPHILIZED, FOR SOLUTION INTRAVENOUS
Status: ACTIVE | COMMUNITY
Start: 2023-12-07

## 2024-02-22 RX ORDER — BUDESONIDE 3 MG/1
3 CAPSULES CAPSULE, DELAYED RELEASE PELLETS ORAL ONCE A DAY
Qty: 90 CAPSULE | Refills: 5 | Status: ACTIVE | COMMUNITY
Start: 2023-12-05

## 2024-02-22 RX ORDER — LIDOCAINE 30 MG/G
1 APPLICATION AS NEEDED CREAM TOPICAL THREE TIMES A DAY
Qty: 30 | Refills: 3 | Status: ON HOLD | COMMUNITY
Start: 2022-01-26

## 2024-02-22 RX ORDER — ACETAMINOPHEN 650 MG
2 TABLETS AS NEEDED TABLET, EXTENDED RELEASE ORAL
Status: ACTIVE | COMMUNITY
Start: 2023-12-07

## 2024-03-07 ENCOUNTER — HEM (OUTPATIENT)
Dept: URBAN - METROPOLITAN AREA MEDICAL CENTER 16 | Facility: MEDICAL CENTER | Age: 24
End: 2024-03-07

## 2024-04-18 ENCOUNTER — ENT (OUTPATIENT)
Dept: URBAN - METROPOLITAN AREA CLINIC 117 | Facility: CLINIC | Age: 24
End: 2024-04-18
Payer: COMMERCIAL

## 2024-04-18 VITALS
BODY MASS INDEX: 27.82 KG/M2 | WEIGHT: 157 LBS | TEMPERATURE: 97.7 F | DIASTOLIC BLOOD PRESSURE: 95 MMHG | HEART RATE: 97 BPM | HEIGHT: 63 IN | RESPIRATION RATE: 20 BRPM | SYSTOLIC BLOOD PRESSURE: 143 MMHG

## 2024-04-18 DIAGNOSIS — K51.80 CHRONIC PANCOLONIC ULCERATIVE COLITIS: ICD-10-CM

## 2024-04-18 PROCEDURE — 96413 CHEMO IV INFUSION 1 HR: CPT | Performed by: INTERNAL MEDICINE

## 2024-04-18 RX ORDER — ACETAMINOPHEN 650 MG
2 TABLETS AS NEEDED TABLET, EXTENDED RELEASE ORAL
Status: ACTIVE | COMMUNITY
Start: 2023-12-07

## 2024-04-18 RX ORDER — BUDESONIDE 3 MG/1
3 CAPSULES CAPSULE, DELAYED RELEASE PELLETS ORAL ONCE A DAY
Qty: 90 CAPSULE | Refills: 5 | Status: ACTIVE | COMMUNITY
Start: 2023-12-05

## 2024-04-18 RX ORDER — LIDOCAINE 30 MG/G
1 APPLICATION AS NEEDED CREAM TOPICAL THREE TIMES A DAY
Qty: 30 | Refills: 3 | Status: ON HOLD | COMMUNITY
Start: 2022-01-26

## 2024-04-18 RX ORDER — VEDOLIZUMAB 300 MG/5ML
AS DIRECTED INJECTION, POWDER, LYOPHILIZED, FOR SOLUTION INTRAVENOUS
Status: ACTIVE | COMMUNITY
Start: 2023-12-07

## 2024-06-13 ENCOUNTER — OFFICE VISIT (OUTPATIENT)
Dept: URBAN - METROPOLITAN AREA CLINIC 117 | Facility: CLINIC | Age: 24
End: 2024-06-13

## 2024-07-01 ENCOUNTER — TELEPHONE ENCOUNTER (OUTPATIENT)
Dept: URBAN - METROPOLITAN AREA CLINIC 117 | Facility: CLINIC | Age: 24
End: 2024-07-01

## 2024-07-05 ENCOUNTER — OFFICE VISIT (OUTPATIENT)
Dept: URBAN - METROPOLITAN AREA CLINIC 117 | Facility: CLINIC | Age: 24
End: 2024-07-05

## 2024-07-10 ENCOUNTER — OFFICE VISIT (OUTPATIENT)
Dept: URBAN - METROPOLITAN AREA CLINIC 117 | Facility: CLINIC | Age: 24
End: 2024-07-10
Payer: COMMERCIAL

## 2024-07-10 VITALS
HEIGHT: 63 IN | RESPIRATION RATE: 18 BRPM | DIASTOLIC BLOOD PRESSURE: 95 MMHG | TEMPERATURE: 97.7 F | HEART RATE: 107 BPM | BODY MASS INDEX: 27.07 KG/M2 | SYSTOLIC BLOOD PRESSURE: 130 MMHG | WEIGHT: 152.8 LBS | OXYGEN SATURATION: 98 %

## 2024-07-10 DIAGNOSIS — K50.80 CROHN'S COLITIS: ICD-10-CM

## 2024-07-10 PROCEDURE — 96413 CHEMO IV INFUSION 1 HR: CPT | Performed by: INTERNAL MEDICINE

## 2024-07-10 RX ORDER — LIDOCAINE 30 MG/G
1 APPLICATION AS NEEDED CREAM TOPICAL THREE TIMES A DAY
Qty: 30 | Refills: 3 | Status: ON HOLD | COMMUNITY
Start: 2022-01-26

## 2024-07-10 RX ORDER — BUDESONIDE 3 MG/1
3 CAPSULES CAPSULE, DELAYED RELEASE PELLETS ORAL ONCE A DAY
Qty: 90 CAPSULE | Refills: 5 | Status: ACTIVE | COMMUNITY
Start: 2023-12-05

## 2024-07-10 RX ORDER — VEDOLIZUMAB 300 MG/5ML
AS DIRECTED INJECTION, POWDER, LYOPHILIZED, FOR SOLUTION INTRAVENOUS
Status: ACTIVE | COMMUNITY
Start: 2023-12-07

## 2024-07-10 RX ORDER — ACETAMINOPHEN 650 MG
2 TABLETS AS NEEDED TABLET, EXTENDED RELEASE ORAL
Status: ACTIVE | COMMUNITY
Start: 2023-12-07

## 2024-07-12 ENCOUNTER — TELEPHONE ENCOUNTER (OUTPATIENT)
Dept: URBAN - METROPOLITAN AREA CLINIC 118 | Facility: CLINIC | Age: 24
End: 2024-07-12

## 2024-08-08 ENCOUNTER — TELEPHONE ENCOUNTER (OUTPATIENT)
Dept: URBAN - METROPOLITAN AREA CLINIC 118 | Facility: CLINIC | Age: 24
End: 2024-08-08

## 2024-08-08 ENCOUNTER — OFFICE VISIT (OUTPATIENT)
Dept: URBAN - METROPOLITAN AREA MEDICAL CENTER 16 | Facility: MEDICAL CENTER | Age: 24
End: 2024-08-08
Payer: COMMERCIAL

## 2024-08-08 DIAGNOSIS — K62.5 ANAL BLEEDING: ICD-10-CM

## 2024-08-08 DIAGNOSIS — K64.8 EXTERNAL HEMORRHOIDS: ICD-10-CM

## 2024-08-08 PROCEDURE — 45350 SGMDSC W/BAND LIGATION: CPT | Performed by: INTERNAL MEDICINE

## 2024-08-08 RX ORDER — VEDOLIZUMAB 300 MG/5ML
AS DIRECTED INJECTION, POWDER, LYOPHILIZED, FOR SOLUTION INTRAVENOUS
Status: ACTIVE | COMMUNITY
Start: 2023-12-07

## 2024-08-08 RX ORDER — BUDESONIDE 3 MG/1
3 CAPSULES CAPSULE, DELAYED RELEASE PELLETS ORAL ONCE A DAY
Qty: 90 CAPSULE | Refills: 5 | Status: ACTIVE | COMMUNITY
Start: 2023-12-05

## 2024-08-08 RX ORDER — ACETAMINOPHEN 650 MG
2 TABLETS AS NEEDED TABLET, EXTENDED RELEASE ORAL
Status: ACTIVE | COMMUNITY
Start: 2023-12-07

## 2024-08-08 RX ORDER — LIDOCAINE 30 MG/G
1 APPLICATION AS NEEDED CREAM TOPICAL THREE TIMES A DAY
Qty: 30 | Refills: 3 | Status: ON HOLD | COMMUNITY
Start: 2022-01-26

## 2024-08-08 RX ORDER — NITROGLYCERIN 4 MG/G
AS DIRECTED OINTMENT RECTAL TWICE A DAY
Qty: 3 | Refills: 1 | OUTPATIENT
Start: 2024-08-08 | End: 2025-04-25

## 2024-08-09 ENCOUNTER — TELEPHONE ENCOUNTER (OUTPATIENT)
Dept: URBAN - METROPOLITAN AREA CLINIC 118 | Facility: CLINIC | Age: 24
End: 2024-08-09

## 2024-08-09 RX ORDER — NITROGLYCERIN 4 MG/G
AS DIRECTED OINTMENT RECTAL TWICE A DAY
Qty: 75 GM | Refills: 3
Start: 2024-08-08 | End: 2024-12-06

## 2024-08-13 ENCOUNTER — OFFICE VISIT (OUTPATIENT)
Dept: URBAN - METROPOLITAN AREA CLINIC 114 | Facility: CLINIC | Age: 24
End: 2024-08-13

## 2024-09-05 ENCOUNTER — OFFICE VISIT (OUTPATIENT)
Dept: URBAN - METROPOLITAN AREA CLINIC 117 | Facility: CLINIC | Age: 24
End: 2024-09-05
Payer: COMMERCIAL

## 2024-09-05 VITALS
SYSTOLIC BLOOD PRESSURE: 137 MMHG | HEART RATE: 96 BPM | DIASTOLIC BLOOD PRESSURE: 95 MMHG | BODY MASS INDEX: 27.29 KG/M2 | WEIGHT: 154 LBS | HEIGHT: 63 IN | RESPIRATION RATE: 20 BRPM | TEMPERATURE: 97.9 F

## 2024-09-05 DIAGNOSIS — K51.80 OTHER ULCERATIVE COLITIS: ICD-10-CM

## 2024-09-05 PROCEDURE — 96413 CHEMO IV INFUSION 1 HR: CPT | Performed by: INTERNAL MEDICINE

## 2024-09-05 RX ORDER — VEDOLIZUMAB 300 MG/5ML
AS DIRECTED INJECTION, POWDER, LYOPHILIZED, FOR SOLUTION INTRAVENOUS
Status: ACTIVE | COMMUNITY
Start: 2023-12-07

## 2024-09-05 RX ORDER — BUDESONIDE 3 MG/1
3 CAPSULES CAPSULE, DELAYED RELEASE PELLETS ORAL ONCE A DAY
Qty: 90 CAPSULE | Refills: 5 | Status: ACTIVE | COMMUNITY
Start: 2023-12-05

## 2024-09-05 RX ORDER — NITROGLYCERIN 4 MG/G
AS DIRECTED OINTMENT RECTAL TWICE A DAY
Qty: 75 GM | Refills: 3 | Status: ACTIVE | COMMUNITY
Start: 2024-08-08 | End: 2024-12-06

## 2024-09-05 RX ORDER — ACETAMINOPHEN 650 MG
2 TABLETS AS NEEDED TABLET, EXTENDED RELEASE ORAL
Status: ACTIVE | COMMUNITY
Start: 2023-12-07

## 2024-09-05 RX ORDER — LIDOCAINE 30 MG/G
1 APPLICATION AS NEEDED CREAM TOPICAL THREE TIMES A DAY
Qty: 30 | Refills: 3 | Status: ON HOLD | COMMUNITY
Start: 2022-01-26

## 2024-10-31 ENCOUNTER — OFFICE VISIT (OUTPATIENT)
Dept: URBAN - METROPOLITAN AREA CLINIC 117 | Facility: CLINIC | Age: 24
End: 2024-10-31

## 2024-10-31 VITALS
RESPIRATION RATE: 20 BRPM | HEART RATE: 86 BPM | SYSTOLIC BLOOD PRESSURE: 149 MMHG | WEIGHT: 154.6 LBS | DIASTOLIC BLOOD PRESSURE: 100 MMHG | HEIGHT: 63 IN | BODY MASS INDEX: 27.39 KG/M2 | TEMPERATURE: 97.7 F

## 2024-10-31 RX ORDER — ACETAMINOPHEN 650 MG
2 TABLETS AS NEEDED TABLET, EXTENDED RELEASE ORAL
Status: ACTIVE | COMMUNITY
Start: 2023-12-07

## 2024-10-31 RX ORDER — LIDOCAINE 30 MG/G
1 APPLICATION AS NEEDED CREAM TOPICAL THREE TIMES A DAY
Qty: 30 | Refills: 3 | Status: ON HOLD | COMMUNITY
Start: 2022-01-26

## 2024-10-31 RX ORDER — VEDOLIZUMAB 300 MG/5ML
AS DIRECTED INJECTION, POWDER, LYOPHILIZED, FOR SOLUTION INTRAVENOUS
Status: ACTIVE | COMMUNITY
Start: 2023-12-07

## 2024-10-31 RX ORDER — BUDESONIDE 3 MG/1
3 CAPSULES CAPSULE, DELAYED RELEASE PELLETS ORAL ONCE A DAY
Qty: 90 CAPSULE | Refills: 5 | Status: ACTIVE | COMMUNITY
Start: 2023-12-05

## 2024-10-31 RX ORDER — NITROGLYCERIN 4 MG/G
AS DIRECTED OINTMENT RECTAL TWICE A DAY
Qty: 75 GM | Refills: 3 | Status: ACTIVE | COMMUNITY
Start: 2024-08-08 | End: 2024-12-06

## 2024-12-18 ENCOUNTER — TELEPHONE ENCOUNTER (OUTPATIENT)
Dept: URBAN - METROPOLITAN AREA CLINIC 117 | Facility: CLINIC | Age: 24
End: 2024-12-18

## 2024-12-18 RX ORDER — ACETAMINOPHEN 650 MG
2 TABLETS AS NEEDED TABLET, EXTENDED RELEASE ORAL
OUTPATIENT
Start: 2024-12-18

## 2024-12-18 RX ORDER — DIPHENHYDRAMINE HCL 2 %
1 CAPSULE AT BEDTIME AS NEEDED CREAM (GRAM) TOPICAL ONCE A DAY
Qty: 30 | OUTPATIENT
Start: 2024-12-18 | End: 2025-01-17

## 2024-12-18 RX ORDER — VEDOLIZUMAB 300 MG/5ML
AS DIRECTED INJECTION, POWDER, LYOPHILIZED, FOR SOLUTION INTRAVENOUS
OUTPATIENT
Start: 2024-12-18

## 2024-12-26 ENCOUNTER — OFFICE VISIT (OUTPATIENT)
Dept: URBAN - METROPOLITAN AREA CLINIC 117 | Facility: CLINIC | Age: 24
End: 2024-12-26

## 2024-12-30 ENCOUNTER — OFFICE VISIT (OUTPATIENT)
Dept: URBAN - METROPOLITAN AREA CLINIC 117 | Facility: CLINIC | Age: 24
End: 2024-12-30

## 2024-12-31 ENCOUNTER — OFFICE VISIT (OUTPATIENT)
Dept: URBAN - METROPOLITAN AREA CLINIC 117 | Facility: CLINIC | Age: 24
End: 2024-12-31

## 2024-12-31 VITALS
TEMPERATURE: 97.5 F | DIASTOLIC BLOOD PRESSURE: 95 MMHG | WEIGHT: 152.8 LBS | SYSTOLIC BLOOD PRESSURE: 139 MMHG | HEIGHT: 63 IN | HEART RATE: 96 BPM | RESPIRATION RATE: 20 BRPM | BODY MASS INDEX: 27.07 KG/M2

## 2024-12-31 RX ORDER — ACETAMINOPHEN 650 MG
2 TABLETS AS NEEDED TABLET, EXTENDED RELEASE ORAL
Status: ACTIVE | COMMUNITY
Start: 2023-12-07

## 2024-12-31 RX ORDER — VEDOLIZUMAB 300 MG/5ML
AS DIRECTED INJECTION, POWDER, LYOPHILIZED, FOR SOLUTION INTRAVENOUS
Status: ACTIVE | COMMUNITY
Start: 2023-12-07

## 2024-12-31 RX ORDER — VEDOLIZUMAB 300 MG/5ML
AS DIRECTED INJECTION, POWDER, LYOPHILIZED, FOR SOLUTION INTRAVENOUS
Status: ACTIVE | COMMUNITY
Start: 2024-12-18

## 2024-12-31 RX ORDER — BUDESONIDE 3 MG/1
3 CAPSULES CAPSULE, DELAYED RELEASE PELLETS ORAL ONCE A DAY
Qty: 90 CAPSULE | Refills: 5 | Status: ACTIVE | COMMUNITY
Start: 2023-12-05

## 2024-12-31 RX ORDER — DIPHENHYDRAMINE HCL 2 %
1 CAPSULE AT BEDTIME AS NEEDED CREAM (GRAM) TOPICAL ONCE A DAY
Qty: 30 | Status: ACTIVE | COMMUNITY
Start: 2024-12-18 | End: 2025-01-17

## 2024-12-31 RX ORDER — LIDOCAINE 30 MG/G
1 APPLICATION AS NEEDED CREAM TOPICAL THREE TIMES A DAY
Qty: 30 | Refills: 3 | Status: ON HOLD | COMMUNITY
Start: 2022-01-26

## 2024-12-31 RX ORDER — ACETAMINOPHEN 650 MG
2 TABLETS AS NEEDED TABLET, EXTENDED RELEASE ORAL
Status: ACTIVE | COMMUNITY
Start: 2024-12-18

## 2025-02-17 ENCOUNTER — TELEPHONE ENCOUNTER (OUTPATIENT)
Dept: URBAN - METROPOLITAN AREA CLINIC 118 | Facility: CLINIC | Age: 25
End: 2025-02-17

## 2025-02-26 ENCOUNTER — OFFICE VISIT (OUTPATIENT)
Dept: URBAN - METROPOLITAN AREA CLINIC 117 | Facility: CLINIC | Age: 25
End: 2025-02-26
Payer: COMMERCIAL

## 2025-02-26 VITALS
WEIGHT: 152 LBS | DIASTOLIC BLOOD PRESSURE: 200 MMHG | SYSTOLIC BLOOD PRESSURE: 152 MMHG | HEIGHT: 63 IN | BODY MASS INDEX: 26.93 KG/M2 | TEMPERATURE: 97.9 F | HEART RATE: 98 BPM | RESPIRATION RATE: 20 BRPM

## 2025-02-26 DIAGNOSIS — K51.919 ULCERATIVE COLITIS WITH COMPLICATION, UNSPECIFIED LOCATION: ICD-10-CM

## 2025-02-26 PROCEDURE — 96413 CHEMO IV INFUSION 1 HR: CPT | Performed by: INTERNAL MEDICINE

## 2025-02-26 RX ORDER — BUDESONIDE 3 MG/1
3 CAPSULES CAPSULE, DELAYED RELEASE PELLETS ORAL ONCE A DAY
Qty: 90 CAPSULE | Refills: 5 | Status: ACTIVE | COMMUNITY
Start: 2023-12-05

## 2025-02-26 RX ORDER — VEDOLIZUMAB 300 MG/5ML
AS DIRECTED INJECTION, POWDER, LYOPHILIZED, FOR SOLUTION INTRAVENOUS
Status: ACTIVE | COMMUNITY
Start: 2024-12-18

## 2025-02-26 RX ORDER — ACETAMINOPHEN 650 MG
2 TABLETS AS NEEDED TABLET, EXTENDED RELEASE ORAL
Status: ACTIVE | COMMUNITY
Start: 2023-12-07

## 2025-02-26 RX ORDER — VEDOLIZUMAB 300 MG/5ML
AS DIRECTED INJECTION, POWDER, LYOPHILIZED, FOR SOLUTION INTRAVENOUS
Status: ACTIVE | COMMUNITY
Start: 2023-12-07

## 2025-02-26 RX ORDER — LIDOCAINE 30 MG/G
1 APPLICATION AS NEEDED CREAM TOPICAL THREE TIMES A DAY
Qty: 30 | Refills: 3 | Status: ON HOLD | COMMUNITY
Start: 2022-01-26

## 2025-02-26 RX ORDER — ACETAMINOPHEN 650 MG
2 TABLETS AS NEEDED TABLET, EXTENDED RELEASE ORAL
Status: ACTIVE | COMMUNITY
Start: 2024-12-18

## 2025-03-13 ENCOUNTER — LAB OUTSIDE AN ENCOUNTER (OUTPATIENT)
Dept: URBAN - METROPOLITAN AREA CLINIC 118 | Facility: CLINIC | Age: 25
End: 2025-03-13

## 2025-03-13 ENCOUNTER — OFFICE VISIT (OUTPATIENT)
Dept: URBAN - METROPOLITAN AREA CLINIC 118 | Facility: CLINIC | Age: 25
End: 2025-03-13
Payer: COMMERCIAL

## 2025-03-13 VITALS
TEMPERATURE: 97.9 F | BODY MASS INDEX: 27.36 KG/M2 | SYSTOLIC BLOOD PRESSURE: 154 MMHG | WEIGHT: 154.4 LBS | HEIGHT: 63 IN | DIASTOLIC BLOOD PRESSURE: 106 MMHG | HEART RATE: 109 BPM

## 2025-03-13 DIAGNOSIS — K52.9 IBD (INFLAMMATORY BOWEL DISEASE): ICD-10-CM

## 2025-03-13 DIAGNOSIS — K51.90 ULCERATIVE COLITIS WITHOUT COMPLICATIONS, UNSPECIFIED LOCATION: ICD-10-CM

## 2025-03-13 DIAGNOSIS — R10.30 LOWER ABDOMINAL PAIN: ICD-10-CM

## 2025-03-13 PROCEDURE — 99214 OFFICE O/P EST MOD 30 MIN: CPT | Performed by: INTERNAL MEDICINE

## 2025-03-13 RX ORDER — LIDOCAINE 30 MG/G
1 APPLICATION AS NEEDED CREAM TOPICAL THREE TIMES A DAY
Qty: 30 | Refills: 3 | Status: ON HOLD | COMMUNITY
Start: 2022-01-26

## 2025-03-13 RX ORDER — DICYCLOMINE HYDROCHLORIDE 20 MG/1
1 TABLET TABLET ORAL
Qty: 90 | Refills: 5 | OUTPATIENT
Start: 2025-03-13 | End: 2025-09-09

## 2025-03-13 RX ORDER — VEDOLIZUMAB 300 MG/5ML
AS DIRECTED INJECTION, POWDER, LYOPHILIZED, FOR SOLUTION INTRAVENOUS
Status: ACTIVE | COMMUNITY
Start: 2024-12-18

## 2025-03-13 RX ORDER — VEDOLIZUMAB 300 MG/5ML
AS DIRECTED INJECTION, POWDER, LYOPHILIZED, FOR SOLUTION INTRAVENOUS
Status: ON HOLD | COMMUNITY
Start: 2023-12-07

## 2025-03-13 RX ORDER — BUDESONIDE 3 MG/1
3 CAPSULES CAPSULE, DELAYED RELEASE PELLETS ORAL ONCE A DAY
Qty: 90 CAPSULE | Refills: 5 | Status: ON HOLD | COMMUNITY
Start: 2023-12-05

## 2025-03-13 RX ORDER — ACETAMINOPHEN 650 MG
2 TABLETS AS NEEDED TABLET, EXTENDED RELEASE ORAL
Status: ON HOLD | COMMUNITY
Start: 2024-12-18

## 2025-03-13 RX ORDER — ACETAMINOPHEN 650 MG
2 TABLETS AS NEEDED TABLET, EXTENDED RELEASE ORAL
Status: ON HOLD | COMMUNITY
Start: 2023-12-07

## 2025-03-13 NOTE — HPI-TODAY'S VISIT:
pt h/o UC currently on Entyvio presents for follow up. Notes occasional mucousy stools. pt notes recent mild left lower abdominal without obvious inciting factors. pt denies rectal bleeding or other LGI symptoms. No recent weight loss or anemia. No UGI symptoms. pt due for repeat colonoscopy. No other complaints.

## 2025-03-22 ENCOUNTER — OFFICE VISIT (OUTPATIENT)
Dept: URBAN - METROPOLITAN AREA SURGERY CENTER 23 | Facility: SURGERY CENTER | Age: 25
End: 2025-03-22

## 2025-03-22 RX ORDER — ACETAMINOPHEN 650 MG
2 TABLETS AS NEEDED TABLET, EXTENDED RELEASE ORAL
Status: ON HOLD | COMMUNITY
Start: 2023-12-07

## 2025-03-22 RX ORDER — DICYCLOMINE HYDROCHLORIDE 20 MG/1
1 TABLET TABLET ORAL
Qty: 90 | Refills: 5 | Status: ACTIVE | COMMUNITY
Start: 2025-03-13 | End: 2025-09-09

## 2025-03-22 RX ORDER — BUDESONIDE 3 MG/1
3 CAPSULES CAPSULE, DELAYED RELEASE PELLETS ORAL ONCE A DAY
Qty: 90 CAPSULE | Refills: 5 | Status: ON HOLD | COMMUNITY
Start: 2023-12-05

## 2025-03-22 RX ORDER — VEDOLIZUMAB 300 MG/5ML
AS DIRECTED INJECTION, POWDER, LYOPHILIZED, FOR SOLUTION INTRAVENOUS
Status: ACTIVE | COMMUNITY
Start: 2024-12-18

## 2025-03-22 RX ORDER — VEDOLIZUMAB 300 MG/5ML
AS DIRECTED INJECTION, POWDER, LYOPHILIZED, FOR SOLUTION INTRAVENOUS
Status: ON HOLD | COMMUNITY
Start: 2023-12-07

## 2025-03-22 RX ORDER — LIDOCAINE 30 MG/G
1 APPLICATION AS NEEDED CREAM TOPICAL THREE TIMES A DAY
Qty: 30 | Refills: 3 | Status: ON HOLD | COMMUNITY
Start: 2022-01-26

## 2025-03-22 RX ORDER — ACETAMINOPHEN 650 MG
2 TABLETS AS NEEDED TABLET, EXTENDED RELEASE ORAL
Status: ON HOLD | COMMUNITY
Start: 2024-12-18

## 2025-04-23 ENCOUNTER — OFFICE VISIT (OUTPATIENT)
Dept: URBAN - METROPOLITAN AREA CLINIC 117 | Facility: CLINIC | Age: 25
End: 2025-04-23
Payer: COMMERCIAL

## 2025-04-23 DIAGNOSIS — K51.919 ULCERATIVE COLITIS WITH COMPLICATION, UNSPECIFIED LOCATION: ICD-10-CM

## 2025-04-23 PROCEDURE — 96413 CHEMO IV INFUSION 1 HR: CPT | Performed by: INTERNAL MEDICINE

## 2025-04-23 RX ORDER — VEDOLIZUMAB 300 MG/5ML
AS DIRECTED INJECTION, POWDER, LYOPHILIZED, FOR SOLUTION INTRAVENOUS
Status: ON HOLD | COMMUNITY
Start: 2023-12-07

## 2025-04-23 RX ORDER — BUDESONIDE 3 MG/1
3 CAPSULES CAPSULE, DELAYED RELEASE PELLETS ORAL ONCE A DAY
Qty: 90 CAPSULE | Refills: 5 | Status: ON HOLD | COMMUNITY
Start: 2023-12-05

## 2025-04-23 RX ORDER — ACETAMINOPHEN 650 MG
2 TABLETS AS NEEDED TABLET, EXTENDED RELEASE ORAL
Status: ON HOLD | COMMUNITY
Start: 2023-12-07

## 2025-04-23 RX ORDER — DICYCLOMINE HYDROCHLORIDE 20 MG/1
1 TABLET TABLET ORAL
Qty: 90 | Refills: 5 | Status: ACTIVE | COMMUNITY
Start: 2025-03-13 | End: 2025-09-09

## 2025-04-23 RX ORDER — LIDOCAINE 30 MG/G
1 APPLICATION AS NEEDED CREAM TOPICAL THREE TIMES A DAY
Qty: 30 | Refills: 3 | Status: ON HOLD | COMMUNITY
Start: 2022-01-26

## 2025-04-23 RX ORDER — VEDOLIZUMAB 300 MG/5ML
AS DIRECTED INJECTION, POWDER, LYOPHILIZED, FOR SOLUTION INTRAVENOUS
Status: ACTIVE | COMMUNITY
Start: 2024-12-18

## 2025-04-23 RX ORDER — ACETAMINOPHEN 650 MG
2 TABLETS AS NEEDED TABLET, EXTENDED RELEASE ORAL
Status: ON HOLD | COMMUNITY
Start: 2024-12-18

## 2025-05-28 ENCOUNTER — TELEPHONE ENCOUNTER (OUTPATIENT)
Dept: URBAN - METROPOLITAN AREA CLINIC 118 | Facility: CLINIC | Age: 25
End: 2025-05-28

## 2025-06-12 ENCOUNTER — TELEPHONE ENCOUNTER (OUTPATIENT)
Dept: URBAN - METROPOLITAN AREA CLINIC 118 | Facility: CLINIC | Age: 25
End: 2025-06-12

## 2025-06-25 ENCOUNTER — OFFICE VISIT (OUTPATIENT)
Dept: URBAN - METROPOLITAN AREA CLINIC 117 | Facility: CLINIC | Age: 25
End: 2025-06-25

## 2025-06-27 ENCOUNTER — OFFICE VISIT (OUTPATIENT)
Dept: URBAN - METROPOLITAN AREA CLINIC 117 | Facility: CLINIC | Age: 25
End: 2025-06-27
Payer: COMMERCIAL

## 2025-06-27 DIAGNOSIS — K51.919 ULCERATIVE COLITIS WITH COMPLICATION, UNSPECIFIED LOCATION: ICD-10-CM

## 2025-06-27 PROCEDURE — 96413 CHEMO IV INFUSION 1 HR: CPT | Performed by: INTERNAL MEDICINE

## 2025-06-27 RX ORDER — BUDESONIDE 3 MG/1
3 CAPSULES CAPSULE, DELAYED RELEASE PELLETS ORAL ONCE A DAY
Qty: 90 CAPSULE | Refills: 5 | Status: ON HOLD | COMMUNITY
Start: 2023-12-05

## 2025-06-27 RX ORDER — VEDOLIZUMAB 300 MG/5ML
AS DIRECTED INJECTION, POWDER, LYOPHILIZED, FOR SOLUTION INTRAVENOUS
Status: ACTIVE | COMMUNITY
Start: 2024-12-18

## 2025-06-27 RX ORDER — VEDOLIZUMAB 300 MG/5ML
AS DIRECTED INJECTION, POWDER, LYOPHILIZED, FOR SOLUTION INTRAVENOUS
Status: ON HOLD | COMMUNITY
Start: 2023-12-07

## 2025-06-27 RX ORDER — ACETAMINOPHEN 650 MG
2 TABLETS AS NEEDED TABLET, EXTENDED RELEASE ORAL
Status: ON HOLD | COMMUNITY
Start: 2023-12-07

## 2025-06-27 RX ORDER — DICYCLOMINE HYDROCHLORIDE 20 MG/1
1 TABLET TABLET ORAL
Qty: 90 | Refills: 5 | Status: ACTIVE | COMMUNITY
Start: 2025-03-13 | End: 2025-09-09

## 2025-06-27 RX ORDER — ACETAMINOPHEN 650 MG
2 TABLETS AS NEEDED TABLET, EXTENDED RELEASE ORAL
Status: ON HOLD | COMMUNITY
Start: 2024-12-18

## 2025-06-27 RX ORDER — LIDOCAINE 30 MG/G
1 APPLICATION AS NEEDED CREAM TOPICAL THREE TIMES A DAY
Qty: 30 | Refills: 3 | Status: ON HOLD | COMMUNITY
Start: 2022-01-26

## 2025-07-03 ENCOUNTER — OFFICE VISIT (OUTPATIENT)
Dept: URBAN - METROPOLITAN AREA CLINIC 118 | Facility: CLINIC | Age: 25
End: 2025-07-03
Payer: COMMERCIAL

## 2025-07-03 DIAGNOSIS — K52.9 IBD (INFLAMMATORY BOWEL DISEASE): ICD-10-CM

## 2025-07-03 DIAGNOSIS — K51.90 ULCERATIVE COLITIS WITHOUT COMPLICATIONS, UNSPECIFIED LOCATION: ICD-10-CM

## 2025-07-03 PROCEDURE — 99214 OFFICE O/P EST MOD 30 MIN: CPT | Performed by: INTERNAL MEDICINE

## 2025-07-03 RX ORDER — DICYCLOMINE HYDROCHLORIDE 20 MG/1
1 TABLET TABLET ORAL
Qty: 90 | Refills: 5 | Status: ACTIVE | COMMUNITY
Start: 2025-03-13 | End: 2025-09-09

## 2025-07-03 RX ORDER — VEDOLIZUMAB 300 MG/5ML
AS DIRECTED INJECTION, POWDER, LYOPHILIZED, FOR SOLUTION INTRAVENOUS
Status: ON HOLD | COMMUNITY
Start: 2023-12-07

## 2025-07-03 RX ORDER — VEDOLIZUMAB 300 MG/5ML
AS DIRECTED INJECTION, POWDER, LYOPHILIZED, FOR SOLUTION INTRAVENOUS
Status: ACTIVE | COMMUNITY
Start: 2024-12-18

## 2025-07-03 RX ORDER — BUDESONIDE 3 MG/1
3 CAPSULES CAPSULE, DELAYED RELEASE PELLETS ORAL ONCE A DAY
Qty: 90 CAPSULE | Refills: 5 | Status: ON HOLD | COMMUNITY
Start: 2023-12-05

## 2025-07-03 RX ORDER — ACETAMINOPHEN 650 MG
2 TABLETS AS NEEDED TABLET, EXTENDED RELEASE ORAL
Status: ON HOLD | COMMUNITY
Start: 2024-12-18

## 2025-07-03 RX ORDER — LIDOCAINE 30 MG/G
1 APPLICATION AS NEEDED CREAM TOPICAL THREE TIMES A DAY
Qty: 30 | Refills: 3 | Status: ON HOLD | COMMUNITY
Start: 2022-01-26

## 2025-07-03 RX ORDER — ACETAMINOPHEN 650 MG
2 TABLETS AS NEEDED TABLET, EXTENDED RELEASE ORAL
Status: ON HOLD | COMMUNITY
Start: 2023-12-07

## 2025-07-03 NOTE — PHYSICAL EXAM NECK/THYROID:
normal appearance , no lymphadenopathy, without tenderness upon palpation , no deformities , trachea midline , thyroid normal size , no masses , thyroid nontender Suture Removal: 7 days

## 2025-07-03 NOTE — HPI-TODAY'S VISIT:
pt presents for GI follow up. pt reports overall doing well from GI standpoint. pt reports recent increased BP and concerned may be due to Entyvio use. pt denies rectal bleeding, abdominal pain or other LGI symptoms. No recent weight loss. No other complaints.

## 2025-07-08 ENCOUNTER — TELEPHONE ENCOUNTER (OUTPATIENT)
Dept: URBAN - METROPOLITAN AREA CLINIC 97 | Facility: CLINIC | Age: 25
End: 2025-07-08

## 2025-07-31 ENCOUNTER — TELEPHONE ENCOUNTER (OUTPATIENT)
Dept: URBAN - METROPOLITAN AREA CLINIC 118 | Facility: CLINIC | Age: 25
End: 2025-07-31

## 2025-08-20 ENCOUNTER — OFFICE VISIT (OUTPATIENT)
Dept: URBAN - METROPOLITAN AREA CLINIC 117 | Facility: CLINIC | Age: 25
End: 2025-08-20
Payer: COMMERCIAL

## 2025-08-20 DIAGNOSIS — K51.919 ACUTE ULCERATIVE COLITIS WITH COMPLICATION: ICD-10-CM

## 2025-08-20 PROCEDURE — 96413 CHEMO IV INFUSION 1 HR: CPT | Performed by: INTERNAL MEDICINE

## 2025-08-20 RX ORDER — ACETAMINOPHEN 650 MG
2 TABLETS AS NEEDED TABLET, EXTENDED RELEASE ORAL
Status: ON HOLD | COMMUNITY
Start: 2023-12-07

## 2025-08-20 RX ORDER — VEDOLIZUMAB 300 MG/5ML
AS DIRECTED INJECTION, POWDER, LYOPHILIZED, FOR SOLUTION INTRAVENOUS
Status: ACTIVE | COMMUNITY
Start: 2024-12-18

## 2025-08-20 RX ORDER — VEDOLIZUMAB 300 MG/5ML
AS DIRECTED INJECTION, POWDER, LYOPHILIZED, FOR SOLUTION INTRAVENOUS
Status: ON HOLD | COMMUNITY
Start: 2023-12-07

## 2025-08-20 RX ORDER — LIDOCAINE 30 MG/G
1 APPLICATION AS NEEDED CREAM TOPICAL THREE TIMES A DAY
Qty: 30 | Refills: 3 | Status: ON HOLD | COMMUNITY
Start: 2022-01-26

## 2025-08-20 RX ORDER — BUDESONIDE 3 MG/1
3 CAPSULES CAPSULE, DELAYED RELEASE PELLETS ORAL ONCE A DAY
Qty: 90 CAPSULE | Refills: 5 | Status: ON HOLD | COMMUNITY
Start: 2023-12-05

## 2025-08-20 RX ORDER — ACETAMINOPHEN 650 MG
2 TABLETS AS NEEDED TABLET, EXTENDED RELEASE ORAL
Status: ON HOLD | COMMUNITY
Start: 2024-12-18

## 2025-08-20 RX ORDER — DICYCLOMINE HYDROCHLORIDE 20 MG/1
1 TABLET TABLET ORAL
Qty: 90 | Refills: 5 | Status: ACTIVE | COMMUNITY
Start: 2025-03-13 | End: 2025-09-09